# Patient Record
Sex: FEMALE | Race: ASIAN | Employment: UNEMPLOYED | URBAN - METROPOLITAN AREA
[De-identification: names, ages, dates, MRNs, and addresses within clinical notes are randomized per-mention and may not be internally consistent; named-entity substitution may affect disease eponyms.]

---

## 2017-08-17 ENCOUNTER — HOSPITAL ENCOUNTER (INPATIENT)
Age: 42
LOS: 5 days | Discharge: HOME HEALTH | DRG: 809 | End: 2017-08-22
Attending: EMERGENCY MEDICINE | Admitting: FAMILY MEDICINE
Payer: MEDICARE

## 2017-08-17 ENCOUNTER — APPOINTMENT (OUTPATIENT)
Dept: GENERAL RADIOLOGY | Age: 42
DRG: 809 | End: 2017-08-17
Attending: EMERGENCY MEDICINE
Payer: MEDICARE

## 2017-08-17 ENCOUNTER — APPOINTMENT (OUTPATIENT)
Dept: CT IMAGING | Age: 42
DRG: 809 | End: 2017-08-17
Attending: EMERGENCY MEDICINE
Payer: MEDICARE

## 2017-08-17 DIAGNOSIS — F32.A DEPRESSED: ICD-10-CM

## 2017-08-17 DIAGNOSIS — R13.10 DYSPHAGIA, UNSPECIFIED TYPE: ICD-10-CM

## 2017-08-17 DIAGNOSIS — D72.819 LEUKOPENIA, UNSPECIFIED TYPE: ICD-10-CM

## 2017-08-17 DIAGNOSIS — I95.9 HYPOTENSION, UNSPECIFIED HYPOTENSION TYPE: ICD-10-CM

## 2017-08-17 DIAGNOSIS — R73.9 HYPERGLYCEMIA: ICD-10-CM

## 2017-08-17 DIAGNOSIS — M79.10 MYALGIA: ICD-10-CM

## 2017-08-17 DIAGNOSIS — Z00.00 ROUTINE GENERAL MEDICAL EXAMINATION AT A HEALTH CARE FACILITY: ICD-10-CM

## 2017-08-17 DIAGNOSIS — R10.30 GROIN PAIN, UNSPECIFIED LATERALITY: ICD-10-CM

## 2017-08-17 DIAGNOSIS — N18.9 CHRONIC RENAL INSUFFICIENCY, UNSPECIFIED STAGE: ICD-10-CM

## 2017-08-17 DIAGNOSIS — B20 HIV (HUMAN IMMUNODEFICIENCY VIRUS INFECTION) (HCC): ICD-10-CM

## 2017-08-17 DIAGNOSIS — B95.62 MRSA BACTEREMIA: Primary | ICD-10-CM

## 2017-08-17 DIAGNOSIS — Z30.42 ENCOUNTER FOR DEPO-PROVERA CONTRACEPTION: ICD-10-CM

## 2017-08-17 DIAGNOSIS — I10 ESSENTIAL HYPERTENSION: ICD-10-CM

## 2017-08-17 DIAGNOSIS — R78.81 MRSA BACTEREMIA: Primary | ICD-10-CM

## 2017-08-17 DIAGNOSIS — E78.00 PURE HYPERCHOLESTEROLEMIA: ICD-10-CM

## 2017-08-17 PROBLEM — Z22.322 MRSA (METHICILLIN RESISTANT STAPH AUREUS) CULTURE POSITIVE: Status: ACTIVE | Noted: 2017-08-17

## 2017-08-17 PROBLEM — D72.10 EOSINOPHILIA: Status: ACTIVE | Noted: 2017-08-17

## 2017-08-17 PROBLEM — R77.8 ELEVATED TROPONIN: Status: ACTIVE | Noted: 2017-08-17

## 2017-08-17 PROBLEM — E87.1 HYPONATREMIA: Status: ACTIVE | Noted: 2017-08-17

## 2017-08-17 PROBLEM — E86.0 DEHYDRATION: Status: ACTIVE | Noted: 2017-08-17

## 2017-08-17 PROBLEM — D64.9 ANEMIA: Status: ACTIVE | Noted: 2017-08-17

## 2017-08-17 PROBLEM — N17.9 AKI (ACUTE KIDNEY INJURY) (HCC): Status: ACTIVE | Noted: 2017-08-17

## 2017-08-17 PROBLEM — M54.2 NECK PAIN ON RIGHT SIDE: Status: ACTIVE | Noted: 2017-08-17

## 2017-08-17 LAB
ADMINISTERED INITIALS, ADMINIT: NORMAL
ALBUMIN SERPL-MCNC: 2.3 G/DL (ref 3.4–5)
ALBUMIN/GLOB SERPL: 0.5 {RATIO} (ref 0.8–1.7)
ALP SERPL-CCNC: 103 U/L (ref 45–117)
ALT SERPL-CCNC: 19 U/L (ref 13–56)
AMORPH CRY URNS QL MICRO: ABNORMAL
ANION GAP BLD CALC-SCNC: 18 MMOL/L (ref 10–20)
ANION GAP SERPL CALC-SCNC: 10 MMOL/L (ref 3–18)
ANION GAP SERPL CALC-SCNC: 11 MMOL/L (ref 3–18)
ANION GAP SERPL CALC-SCNC: 9 MMOL/L (ref 3–18)
ANION GAP SERPL CALC-SCNC: 9 MMOL/L (ref 3–18)
APPEARANCE UR: CLEAR
ARTERIAL PATENCY WRIST A: ABNORMAL
AST SERPL-CCNC: 5 U/L (ref 15–37)
BACTERIA URNS QL MICRO: ABNORMAL /HPF
BASE EXCESS BLDV CALC-SCNC: 1 MMOL/L
BASOPHILS # BLD: 0.1 K/UL (ref 0–0.06)
BASOPHILS # BLD: 0.1 K/UL (ref 0–0.1)
BASOPHILS NFR BLD: 5 % (ref 0–2)
BASOPHILS NFR BLD: 6 % (ref 0–3)
BDY SITE: ABNORMAL
BILIRUB SERPL-MCNC: 1 MG/DL (ref 0.2–1)
BILIRUB UR QL: NEGATIVE
BODY TEMPERATURE: 97.6
BUN BLD-MCNC: 55 MG/DL (ref 7–18)
BUN SERPL-MCNC: 33 MG/DL (ref 7–18)
BUN SERPL-MCNC: 36 MG/DL (ref 7–18)
BUN SERPL-MCNC: 51 MG/DL (ref 7–18)
BUN SERPL-MCNC: 55 MG/DL (ref 7–18)
BUN/CREAT SERPL: 14 (ref 12–20)
BUN/CREAT SERPL: 15 (ref 12–20)
BUN/CREAT SERPL: 15 (ref 12–20)
BUN/CREAT SERPL: 16 (ref 12–20)
CA-I BLD-MCNC: 0.97 MMOL/L (ref 1.12–1.32)
CALCIUM SERPL-MCNC: 6.3 MG/DL (ref 8.5–10.1)
CALCIUM SERPL-MCNC: 6.9 MG/DL (ref 8.5–10.1)
CALCIUM SERPL-MCNC: 7.3 MG/DL (ref 8.5–10.1)
CALCIUM SERPL-MCNC: 7.4 MG/DL (ref 8.5–10.1)
CHLORIDE BLD-SCNC: 92 MMOL/L (ref 100–108)
CHLORIDE SERPL-SCNC: 103 MMOL/L (ref 100–108)
CHLORIDE SERPL-SCNC: 104 MMOL/L (ref 100–108)
CHLORIDE SERPL-SCNC: 110 MMOL/L (ref 100–108)
CHLORIDE SERPL-SCNC: 92 MMOL/L (ref 100–108)
CK MB CFR SERPL CALC: ABNORMAL % (ref 0–4)
CK MB SERPL-MCNC: <1 NG/ML (ref 5–25)
CK SERPL-CCNC: 32 U/L (ref 26–192)
CO2 BLD-SCNC: 23 MMOL/L (ref 19–24)
CO2 SERPL-SCNC: 20 MMOL/L (ref 21–32)
CO2 SERPL-SCNC: 21 MMOL/L (ref 21–32)
CO2 SERPL-SCNC: 21 MMOL/L (ref 21–32)
CO2 SERPL-SCNC: 25 MMOL/L (ref 21–32)
COLOR UR: YELLOW
CREAT SERPL-MCNC: 2.38 MG/DL (ref 0.6–1.3)
CREAT SERPL-MCNC: 2.43 MG/DL (ref 0.6–1.3)
CREAT SERPL-MCNC: 3.21 MG/DL (ref 0.6–1.3)
CREAT SERPL-MCNC: 3.79 MG/DL (ref 0.6–1.3)
CREAT UR-MCNC: 3.6 MG/DL (ref 0.6–1.3)
D50 ADMINISTERED, D50ADM: 0 ML
D50 ADMINISTERED, D50ADM: 12 ML
D50 ORDER, D50ORD: 0 ML
D50 ORDER, D50ORD: 12 ML
DIFFERENTIAL METHOD BLD: ABNORMAL
DIFFERENTIAL METHOD BLD: ABNORMAL
EOSINOPHIL # BLD: 0.1 K/UL (ref 0–0.4)
EOSINOPHIL # BLD: 0.5 K/UL (ref 0–0.4)
EOSINOPHIL NFR BLD: 30 % (ref 0–5)
EOSINOPHIL NFR BLD: 8 % (ref 0–5)
EPITH CASTS URNS QL MICRO: ABNORMAL /LPF (ref 0–5)
ERYTHROCYTE [DISTWIDTH] IN BLOOD BY AUTOMATED COUNT: 16.5 % (ref 11.6–14.5)
ERYTHROCYTE [DISTWIDTH] IN BLOOD BY AUTOMATED COUNT: 16.6 % (ref 11.6–14.5)
EST. AVERAGE GLUCOSE BLD GHB EST-MCNC: 235 MG/DL
GAS FLOW.O2 O2 DELIVERY SYS: ABNORMAL L/MIN
GLOBULIN SER CALC-MCNC: 5 G/DL (ref 2–4)
GLUCOSE BLD STRIP.AUTO-MCNC: 121 MG/DL (ref 70–110)
GLUCOSE BLD STRIP.AUTO-MCNC: 151 MG/DL (ref 70–110)
GLUCOSE BLD STRIP.AUTO-MCNC: 206 MG/DL (ref 70–110)
GLUCOSE BLD STRIP.AUTO-MCNC: 244 MG/DL (ref 70–110)
GLUCOSE BLD STRIP.AUTO-MCNC: 266 MG/DL (ref 70–110)
GLUCOSE BLD STRIP.AUTO-MCNC: 356 MG/DL (ref 70–110)
GLUCOSE BLD STRIP.AUTO-MCNC: 437 MG/DL (ref 70–110)
GLUCOSE BLD STRIP.AUTO-MCNC: 474 MG/DL (ref 70–110)
GLUCOSE BLD STRIP.AUTO-MCNC: 565 MG/DL (ref 70–110)
GLUCOSE BLD STRIP.AUTO-MCNC: 591 MG/DL (ref 74–106)
GLUCOSE BLD STRIP.AUTO-MCNC: 598 MG/DL (ref 70–110)
GLUCOSE BLD STRIP.AUTO-MCNC: 70 MG/DL (ref 70–110)
GLUCOSE BLD STRIP.AUTO-MCNC: 80 MG/DL (ref 70–110)
GLUCOSE BLD STRIP.AUTO-MCNC: 81 MG/DL (ref 70–110)
GLUCOSE BLD STRIP.AUTO-MCNC: 87 MG/DL (ref 70–110)
GLUCOSE BLD STRIP.AUTO-MCNC: 90 MG/DL (ref 70–110)
GLUCOSE BLD STRIP.AUTO-MCNC: 94 MG/DL (ref 70–110)
GLUCOSE BLD STRIP.AUTO-MCNC: 95 MG/DL (ref 70–110)
GLUCOSE SERPL-MCNC: 283 MG/DL (ref 74–99)
GLUCOSE SERPL-MCNC: 385 MG/DL (ref 74–99)
GLUCOSE SERPL-MCNC: 591 MG/DL (ref 74–99)
GLUCOSE SERPL-MCNC: 71 MG/DL (ref 74–99)
GLUCOSE UR STRIP.AUTO-MCNC: >1000 MG/DL
GLUCOSE, GLC: 121 MG/DL
GLUCOSE, GLC: 151 MG/DL
GLUCOSE, GLC: 206 MG/DL
GLUCOSE, GLC: 244 MG/DL
GLUCOSE, GLC: 266 MG/DL
GLUCOSE, GLC: 356 MG/DL
GLUCOSE, GLC: 437 MG/DL
GLUCOSE, GLC: 474 MG/DL
GLUCOSE, GLC: 563 MG/DL
GLUCOSE, GLC: 591 MG/DL
GLUCOSE, GLC: 70 MG/DL
GLUCOSE, GLC: 80 MG/DL
GLUCOSE, GLC: 81 MG/DL
GLUCOSE, GLC: 90 MG/DL
GLUCOSE, GLC: 92 MG/DL
GLUCOSE, GLC: 95 MG/DL
HBA1C MFR BLD: 9.8 % (ref 4.2–5.6)
HCG SERPL QL: NEGATIVE
HCO3 BLDV-SCNC: 25.4 MMOL/L (ref 23–28)
HCT VFR BLD AUTO: 27.5 % (ref 35–45)
HCT VFR BLD AUTO: 30.1 % (ref 35–45)
HCT VFR BLD CALC: 30 % (ref 36–49)
HETEROPH AB SER QL: NEGATIVE
HGB BLD-MCNC: 10.2 G/DL (ref 12–16)
HGB BLD-MCNC: 10.6 G/DL (ref 12–16)
HGB BLD-MCNC: 9.4 G/DL (ref 12–16)
HGB UR QL STRIP: NEGATIVE
HIGH TARGET, HITG: 180 MG/DL
INR PPP: 1 (ref 0.8–1.2)
INSULIN ADMINSTERED, INSADM: 0 UNITS/HOUR
INSULIN ADMINSTERED, INSADM: 0.2 UNITS/HOUR
INSULIN ADMINSTERED, INSADM: 1.2 UNITS/HOUR
INSULIN ADMINSTERED, INSADM: 10.6 UNITS/HOUR
INSULIN ADMINSTERED, INSADM: 11.3 UNITS/HOUR
INSULIN ADMINSTERED, INSADM: 15.1 UNITS/HOUR
INSULIN ADMINSTERED, INSADM: 2.1 UNITS/HOUR
INSULIN ADMINSTERED, INSADM: 2.7 UNITS/HOUR
INSULIN ADMINSTERED, INSADM: 3.7 UNITS/HOUR
INSULIN ADMINSTERED, INSADM: 4.4 UNITS/HOUR
INSULIN ADMINSTERED, INSADM: 5.9 UNITS/HOUR
INSULIN ADMINSTERED, INSADM: 8.3 UNITS/HOUR
INSULIN ORDER, INSORD: 0 UNITS/HOUR
INSULIN ORDER, INSORD: 0.2 UNITS/HOUR
INSULIN ORDER, INSORD: 1.2 UNITS/HOUR
INSULIN ORDER, INSORD: 10.6 UNITS/HOUR
INSULIN ORDER, INSORD: 11.3 UNITS/HOUR
INSULIN ORDER, INSORD: 15.1 UNITS/HOUR
INSULIN ORDER, INSORD: 2.1 UNITS/HOUR
INSULIN ORDER, INSORD: 2.7 UNITS/HOUR
INSULIN ORDER, INSORD: 3.7 UNITS/HOUR
INSULIN ORDER, INSORD: 4.4 UNITS/HOUR
INSULIN ORDER, INSORD: 5.9 UNITS/HOUR
INSULIN ORDER, INSORD: 8.3 UNITS/HOUR
KETONES UR QL STRIP.AUTO: NEGATIVE MG/DL
LACTATE BLD-SCNC: 1.2 MMOL/L (ref 0.4–2)
LEUKOCYTE ESTERASE UR QL STRIP.AUTO: NEGATIVE
LOW TARGET, LOT: 140 MG/DL
LYMPHOCYTES # BLD: 0.9 K/UL (ref 0.9–3.6)
LYMPHOCYTES # BLD: 1 K/UL (ref 0.8–3.5)
LYMPHOCYTES NFR BLD: 57 % (ref 21–52)
LYMPHOCYTES NFR BLD: 80 % (ref 20–51)
MAGNESIUM SERPL-MCNC: 2.2 MG/DL (ref 1.6–2.6)
MCH RBC QN AUTO: 27.8 PG (ref 24–34)
MCH RBC QN AUTO: 28.4 PG (ref 24–34)
MCHC RBC AUTO-ENTMCNC: 34.2 G/DL (ref 31–37)
MCHC RBC AUTO-ENTMCNC: 35.2 G/DL (ref 31–37)
MCV RBC AUTO: 80.7 FL (ref 74–97)
MCV RBC AUTO: 81.4 FL (ref 74–97)
MINUTES UNTIL NEXT BG, NBG: 15 MIN
MINUTES UNTIL NEXT BG, NBG: 60 MIN
MONOCYTES # BLD: 0.1 K/UL (ref 0.05–1.2)
MONOCYTES # BLD: 0.1 K/UL (ref 0–1)
MONOCYTES NFR BLD: 4 % (ref 2–9)
MONOCYTES NFR BLD: 5 % (ref 3–10)
MULTIPLIER, MUL: 0
MULTIPLIER, MUL: 0.01
MULTIPLIER, MUL: 0.01
MULTIPLIER, MUL: 0.02
MULTIPLIER, MUL: 0.03
NEUTS SEG # BLD: 0 K/UL (ref 1.8–8)
NEUTS SEG # BLD: 0.1 K/UL (ref 1.8–8)
NEUTS SEG NFR BLD: 2 % (ref 42–75)
NEUTS SEG NFR BLD: 3 % (ref 40–73)
NITRITE UR QL STRIP.AUTO: NEGATIVE
O2/TOTAL GAS SETTING VFR VENT: 21 %
ORDER INITIALS, ORDINIT: NORMAL
PCO2 BLDV: 40.4 MMHG (ref 41–51)
PH BLDV: 7.4 [PH] (ref 7.32–7.42)
PH UR STRIP: 6 [PH] (ref 5–8)
PLATELET # BLD AUTO: 245 K/UL (ref 135–420)
PLATELET # BLD AUTO: 291 K/UL (ref 135–420)
PMV BLD AUTO: 10.9 FL (ref 9.2–11.8)
PMV BLD AUTO: 11.1 FL (ref 9.2–11.8)
PO2 BLDV: 32 MMHG (ref 25–40)
POTASSIUM BLD-SCNC: 4.7 MMOL/L (ref 3.5–5.5)
POTASSIUM SERPL-SCNC: 3.4 MMOL/L (ref 3.5–5.5)
POTASSIUM SERPL-SCNC: 4.2 MMOL/L (ref 3.5–5.5)
POTASSIUM SERPL-SCNC: 4.2 MMOL/L (ref 3.5–5.5)
POTASSIUM SERPL-SCNC: 4.4 MMOL/L (ref 3.5–5.5)
PROT SERPL-MCNC: 7.3 G/DL (ref 6.4–8.2)
PROT UR STRIP-MCNC: 300 MG/DL
PROTHROMBIN TIME: 13.2 SEC (ref 11.5–15.2)
RBC # BLD AUTO: 3.38 M/UL (ref 4.2–5.3)
RBC # BLD AUTO: 3.73 M/UL (ref 4.2–5.3)
RBC #/AREA URNS HPF: ABNORMAL /HPF (ref 0–5)
RBC MORPH BLD: ABNORMAL
SAO2 % BLDV: 63 % (ref 65–88)
SERVICE CMNT-IMP: ABNORMAL
SODIUM BLD-SCNC: 127 MMOL/L (ref 136–145)
SODIUM SERPL-SCNC: 126 MMOL/L (ref 136–145)
SODIUM SERPL-SCNC: 134 MMOL/L (ref 136–145)
SODIUM SERPL-SCNC: 135 MMOL/L (ref 136–145)
SODIUM SERPL-SCNC: 140 MMOL/L (ref 136–145)
SP GR UR REFRACTOMETRY: 1.02 (ref 1–1.03)
SPECIMEN TYPE: ABNORMAL
TOTAL RESP. RATE, ITRR: 27
TROPONIN I SERPL-MCNC: 0.03 NG/ML (ref 0–0.04)
TROPONIN I SERPL-MCNC: 0.04 NG/ML (ref 0–0.04)
TROPONIN I SERPL-MCNC: 0.04 NG/ML (ref 0–0.04)
TROPONIN I SERPL-MCNC: 0.05 NG/ML (ref 0–0.04)
TSH SERPL DL<=0.05 MIU/L-ACNC: 1.32 UIU/ML (ref 0.36–3.74)
UROBILINOGEN UR QL STRIP.AUTO: 0.2 EU/DL (ref 0.2–1)
WBC # BLD AUTO: 1.3 K/UL (ref 4.6–13.2)
WBC # BLD AUTO: 1.6 K/UL (ref 4.6–13.2)
WBC URNS QL MICRO: ABNORMAL /HPF (ref 0–4)

## 2017-08-17 PROCEDURE — 96367 TX/PROPH/DG ADDL SEQ IV INF: CPT

## 2017-08-17 PROCEDURE — 77030020263 HC SOL INJ SOD CL0.9% LFCR 1000ML

## 2017-08-17 PROCEDURE — 65610000006 HC RM INTENSIVE CARE

## 2017-08-17 PROCEDURE — 74011636637 HC RX REV CODE- 636/637: Performed by: EMERGENCY MEDICINE

## 2017-08-17 PROCEDURE — 87077 CULTURE AEROBIC IDENTIFY: CPT | Performed by: INTERNAL MEDICINE

## 2017-08-17 PROCEDURE — 74011250637 HC RX REV CODE- 250/637: Performed by: FAMILY MEDICINE

## 2017-08-17 PROCEDURE — 83735 ASSAY OF MAGNESIUM: CPT | Performed by: EMERGENCY MEDICINE

## 2017-08-17 PROCEDURE — 81001 URINALYSIS AUTO W/SCOPE: CPT | Performed by: EMERGENCY MEDICINE

## 2017-08-17 PROCEDURE — 80047 BASIC METABLC PNL IONIZED CA: CPT

## 2017-08-17 PROCEDURE — 70490 CT SOFT TISSUE NECK W/O DYE: CPT

## 2017-08-17 PROCEDURE — 96361 HYDRATE IV INFUSION ADD-ON: CPT

## 2017-08-17 PROCEDURE — 85025 COMPLETE CBC W/AUTO DIFF WBC: CPT | Performed by: EMERGENCY MEDICINE

## 2017-08-17 PROCEDURE — 82550 ASSAY OF CK (CPK): CPT | Performed by: EMERGENCY MEDICINE

## 2017-08-17 PROCEDURE — 36415 COLL VENOUS BLD VENIPUNCTURE: CPT | Performed by: FAMILY MEDICINE

## 2017-08-17 PROCEDURE — 82962 GLUCOSE BLOOD TEST: CPT

## 2017-08-17 PROCEDURE — 74011636637 HC RX REV CODE- 636/637

## 2017-08-17 PROCEDURE — 86308 HETEROPHILE ANTIBODY SCREEN: CPT | Performed by: INTERNAL MEDICINE

## 2017-08-17 PROCEDURE — 86664 EPSTEIN-BARR NUCLEAR ANTIGEN: CPT | Performed by: INTERNAL MEDICINE

## 2017-08-17 PROCEDURE — 84443 ASSAY THYROID STIM HORMONE: CPT | Performed by: EMERGENCY MEDICINE

## 2017-08-17 PROCEDURE — 87086 URINE CULTURE/COLONY COUNT: CPT | Performed by: EMERGENCY MEDICINE

## 2017-08-17 PROCEDURE — 74011250636 HC RX REV CODE- 250/636: Performed by: FAMILY MEDICINE

## 2017-08-17 PROCEDURE — 71010 XR CHEST PORT: CPT

## 2017-08-17 PROCEDURE — 86644 CMV ANTIBODY: CPT | Performed by: INTERNAL MEDICINE

## 2017-08-17 PROCEDURE — 51702 INSERT TEMP BLADDER CATH: CPT

## 2017-08-17 PROCEDURE — 80053 COMPREHEN METABOLIC PANEL: CPT | Performed by: EMERGENCY MEDICINE

## 2017-08-17 PROCEDURE — 84703 CHORIONIC GONADOTROPIN ASSAY: CPT | Performed by: EMERGENCY MEDICINE

## 2017-08-17 PROCEDURE — 77030034848

## 2017-08-17 PROCEDURE — 87040 BLOOD CULTURE FOR BACTERIA: CPT | Performed by: EMERGENCY MEDICINE

## 2017-08-17 PROCEDURE — 87081 CULTURE SCREEN ONLY: CPT | Performed by: INTERNAL MEDICINE

## 2017-08-17 PROCEDURE — 96365 THER/PROPH/DIAG IV INF INIT: CPT

## 2017-08-17 PROCEDURE — 85610 PROTHROMBIN TIME: CPT | Performed by: EMERGENCY MEDICINE

## 2017-08-17 PROCEDURE — 83605 ASSAY OF LACTIC ACID: CPT

## 2017-08-17 PROCEDURE — 74011000250 HC RX REV CODE- 250: Performed by: HOSPITALIST

## 2017-08-17 PROCEDURE — 82803 BLOOD GASES ANY COMBINATION: CPT

## 2017-08-17 PROCEDURE — 74011000258 HC RX REV CODE- 258: Performed by: EMERGENCY MEDICINE

## 2017-08-17 PROCEDURE — 83036 HEMOGLOBIN GLYCOSYLATED A1C: CPT | Performed by: EMERGENCY MEDICINE

## 2017-08-17 PROCEDURE — 74011250636 HC RX REV CODE- 250/636: Performed by: EMERGENCY MEDICINE

## 2017-08-17 PROCEDURE — 77030011256 HC DRSG MEPILEX <16IN NO BORD MOLN -A

## 2017-08-17 PROCEDURE — 93005 ELECTROCARDIOGRAM TRACING: CPT

## 2017-08-17 PROCEDURE — 99285 EMERGENCY DEPT VISIT HI MDM: CPT

## 2017-08-17 PROCEDURE — 80048 BASIC METABOLIC PNL TOTAL CA: CPT | Performed by: FAMILY MEDICINE

## 2017-08-17 RX ORDER — MAGNESIUM SULFATE 100 %
4 CRYSTALS MISCELLANEOUS AS NEEDED
Status: DISCONTINUED | OUTPATIENT
Start: 2017-08-17 | End: 2017-08-22 | Stop reason: HOSPADM

## 2017-08-17 RX ORDER — DEXTROSE 50 % IN WATER (D50W) INTRAVENOUS SYRINGE
25-50 AS NEEDED
Status: DISCONTINUED | OUTPATIENT
Start: 2017-08-17 | End: 2017-08-22 | Stop reason: HOSPADM

## 2017-08-17 RX ORDER — NOREPINEPHRINE BITARTRATE/D5W 8 MG/250ML
2-16 PLASTIC BAG, INJECTION (ML) INTRAVENOUS
Status: DISCONTINUED | OUTPATIENT
Start: 2017-08-17 | End: 2017-08-18

## 2017-08-17 RX ORDER — LEVOFLOXACIN 5 MG/ML
750 INJECTION, SOLUTION INTRAVENOUS
Status: COMPLETED | OUTPATIENT
Start: 2017-08-17 | End: 2017-08-17

## 2017-08-17 RX ORDER — LINEZOLID 2 MG/ML
600 INJECTION, SOLUTION INTRAVENOUS EVERY 12 HOURS
Status: DISCONTINUED | OUTPATIENT
Start: 2017-08-17 | End: 2017-08-18

## 2017-08-17 RX ORDER — MORPHINE SULFATE 2 MG/ML
2 INJECTION, SOLUTION INTRAMUSCULAR; INTRAVENOUS
Status: DISCONTINUED | OUTPATIENT
Start: 2017-08-17 | End: 2017-08-18

## 2017-08-17 RX ORDER — HEPARIN SODIUM 5000 [USP'U]/ML
5000 INJECTION, SOLUTION INTRAVENOUS; SUBCUTANEOUS EVERY 8 HOURS
Status: DISCONTINUED | OUTPATIENT
Start: 2017-08-17 | End: 2017-08-22 | Stop reason: HOSPADM

## 2017-08-17 RX ORDER — DEXTROSE 50 % IN WATER (D50W) INTRAVENOUS SYRINGE
12 ONCE
Status: COMPLETED | OUTPATIENT
Start: 2017-08-17 | End: 2017-08-17

## 2017-08-17 RX ORDER — SODIUM CHLORIDE 9 MG/ML
150 INJECTION, SOLUTION INTRAVENOUS CONTINUOUS
Status: DISCONTINUED | OUTPATIENT
Start: 2017-08-17 | End: 2017-08-18

## 2017-08-17 RX ORDER — ACETAMINOPHEN 650 MG/1
650 SUPPOSITORY RECTAL
Status: DISCONTINUED | OUTPATIENT
Start: 2017-08-17 | End: 2017-08-22 | Stop reason: HOSPADM

## 2017-08-17 RX ORDER — INSULIN LISPRO 100 [IU]/ML
INJECTION, SOLUTION INTRAVENOUS; SUBCUTANEOUS EVERY 6 HOURS
Status: DISCONTINUED | OUTPATIENT
Start: 2017-08-18 | End: 2017-08-18

## 2017-08-17 RX ADMIN — HEPARIN SODIUM 5000 UNITS: 5000 INJECTION, SOLUTION INTRAVENOUS; SUBCUTANEOUS at 23:48

## 2017-08-17 RX ADMIN — DEXTROSE MONOHYDRATE 6 G: 25 INJECTION, SOLUTION INTRAVENOUS at 19:05

## 2017-08-17 RX ADMIN — MORPHINE SULFATE 2 MG: 2 INJECTION, SOLUTION INTRAMUSCULAR; INTRAVENOUS at 15:07

## 2017-08-17 RX ADMIN — PIPERACILLIN SODIUM,TAZOBACTAM SODIUM 4.5 G: 4; .5 INJECTION, POWDER, FOR SOLUTION INTRAVENOUS at 03:14

## 2017-08-17 RX ADMIN — SODIUM CHLORIDE 150 ML/HR: 900 INJECTION, SOLUTION INTRAVENOUS at 15:33

## 2017-08-17 RX ADMIN — LINEZOLID 600 MG: 600 INJECTION, SOLUTION INTRAVENOUS at 14:26

## 2017-08-17 RX ADMIN — SODIUM CHLORIDE 1000 ML: 900 INJECTION, SOLUTION INTRAVENOUS at 04:56

## 2017-08-17 RX ADMIN — SODIUM CHLORIDE 10.6 UNITS/HR: 900 INJECTION, SOLUTION INTRAVENOUS at 03:51

## 2017-08-17 RX ADMIN — HEPARIN SODIUM 5000 UNITS: 5000 INJECTION, SOLUTION INTRAVENOUS; SUBCUTANEOUS at 08:07

## 2017-08-17 RX ADMIN — SODIUM CHLORIDE 150 ML/HR: 900 INJECTION, SOLUTION INTRAVENOUS at 19:32

## 2017-08-17 RX ADMIN — MORPHINE SULFATE 2 MG: 2 INJECTION, SOLUTION INTRAMUSCULAR; INTRAVENOUS at 08:08

## 2017-08-17 RX ADMIN — SODIUM CHLORIDE 150 ML/HR: 900 INJECTION, SOLUTION INTRAVENOUS at 06:50

## 2017-08-17 RX ADMIN — LINEZOLID 600 MG: 600 INJECTION, SOLUTION INTRAVENOUS at 05:30

## 2017-08-17 RX ADMIN — HEPARIN SODIUM 5000 UNITS: 5000 INJECTION, SOLUTION INTRAVENOUS; SUBCUTANEOUS at 14:23

## 2017-08-17 RX ADMIN — ACETAMINOPHEN 650 MG: 650 SOLUTION ORAL at 20:46

## 2017-08-17 RX ADMIN — SODIUM CHLORIDE 15.1 UNITS/HR: 900 INJECTION, SOLUTION INTRAVENOUS at 04:51

## 2017-08-17 RX ADMIN — SODIUM CHLORIDE 8.3 UNITS/HR: 900 INJECTION, SOLUTION INTRAVENOUS at 05:57

## 2017-08-17 RX ADMIN — LEVOFLOXACIN 750 MG: 5 INJECTION, SOLUTION INTRAVENOUS at 03:58

## 2017-08-17 RX ADMIN — SODIUM CHLORIDE 2000 ML: 900 INJECTION, SOLUTION INTRAVENOUS at 02:03

## 2017-08-17 RX ADMIN — Medication 8.3 UNITS: at 06:34

## 2017-08-17 RX ADMIN — MORPHINE SULFATE 2 MG: 2 INJECTION, SOLUTION INTRAMUSCULAR; INTRAVENOUS at 23:47

## 2017-08-17 NOTE — IP AVS SNAPSHOT
[address] [place] [address] 
[phone] Patient: Mark MRN: [id number] QWV:7/99/1972 You are allergic to the following Allergen Reactions Metformin Other (comments) Stopped because of heart disease by cardiologist 
  
    
 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 CONFIRM ONLY, BY MULTISPOT In process CULTURE, BLOOD Preliminary result CULTURE, BLOOD Preliminary result Emergency Contacts Name Discharge Info Relation Home Work Mobile Fely Montague  Other Relative [6] [phone] Tania Guillen  Child [2]   [phone] About your hospitalization You were admitted on:  August 17, 2017 You last received care in the:  [address] [place], [place] [address] You were discharged on:  August 22, 2017 Unit phone number:  [phone] Why you were hospitalized Your primary diagnosis was: Tonsillitis Your diagnoses also included:  Hiv (Human Immunodeficiency Virus Infection) ([facility]), Dehydration, Hyponatremia, Leukopenia, Anemia, Hyperglycemia, Dysphagia, Elevated Troponin, Nirav (Acute Kidney Injury) ([facility]), 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, Neck Pain On Right Side, Eosinophilia, Diabetes Mellitus Type 2, Controlled ([facility]) Providers Seen During Your Hospitalizations Provider Role Specialty Primary office phone Manuel Watkins MD Attending Provider Emergency Medicine [phone] Chris Talley MD Attending Provider [facility] [phone] Elder Mendez DO Attending Provider Internal Medicine [phone] Arun Rondon MD Attending Provider [facility] [phone] Rita Sawant,  Attending Provider Internal Medicine [phone] Your Primary Care Physician (PCP) Primary Care Physician Office Phone Office Fax Salas Lozano [phone] Follow-up Information Follow up With Details Comments Contact Info Naresh Chairez.DO On 8/28/2017 8am [address] [facility] [address] [facility] [place] [address] [phone] Your Appointments Monday August 28, 2017  8:00 AM EDT HOSPITAL FOLLOW-UP with Naresh Chairez.,  [address] [address] [address] [address]  [facility] [address] [phone] Current Discharge Medication List  
  
START taking these medications Dose & Instructions Dispensing Information Comments Morning Noon Evening Bedtime  
 clindamycin 300 mg capsule Commonly known as:  CLEOCIN Your last dose was: Your next dose is:    
   
   
 Dose:  300 mg Take 1 Cap by mouth three (3) times daily. Quantity:  24 Cap Refills:  0  
     
   
   
   
  
 lidocaine 2 % solution Commonly known as:  XYLOCAINE Your last dose was: Your next dose is:    
   
   
 Dose:  5 mL Take 5 mL by mouth three (3) times daily as needed for Pain (gargle and then spit out). Quantity:  1 Bottle Refills:  0  
     
   
   
   
  
 methylPREDNISolone 4 mg tablet Commonly known as:  Richard Sales Your last dose was: Your next dose is: Take as instructed Quantity:  1 Dose Pack Refills:  0  
     
   
   
   
  
 oxyCODONE-acetaminophen 5-325 mg per tablet Commonly known as:  PERCOCET Your last dose was: Your next dose is:    
   
   
 Dose:  1-2 Tab Take 1-2 Tabs by mouth every four (4) hours as needed. Max Daily Amount: 12 Tabs. Quantity:  12 Tab Refills:  0 CONTINUE these medications which have CHANGED Dose & Instructions Dispensing Information Comments Morning Noon Evening Bedtime  
 emtricitabine-tenofovir (TDF) 200-300 mg per tablet Commonly known as:  Moiz Ghotra What changed:  how much to take Your last dose was: Your next dose is:    
   
   
 Dose:  1 Tab Take 1 Tab by mouth daily. Quantity:  30 Tab Refills:  0  
     
   
   
   
  
 metoprolol tartrate 100 mg IR tablet Commonly known as:  LOPRESSOR What changed:  medication strength Your last dose was: Your next dose is:    
   
   
 Dose:  100 mg Take 1 Tab by mouth two (2) times a day. Quantity:  60 Tab Refills:  0 CONTINUE these medications which have NOT CHANGED Dose & Instructions Dispensing Information Comments Morning Noon Evening Bedtime  
 amitriptyline 100 mg tablet Commonly known as:  ELAVIL Your last dose was: Your next dose is:    
   
   
 Dose:  100 mg Take 100 mg by mouth nightly. Refills:  0  
     
   
   
   
  
 aspirin 81 mg chewable tablet Your last dose was: Your next dose is:    
   
   
 Dose:  81 mg Take 81 mg by mouth daily. Refills:  0  
     
   
   
   
  
 atorvastatin 80 mg tablet Commonly known as:  LIPITOR Your last dose was: Your next dose is:    
   
   
 Dose:  80 mg Take 1 Tab by mouth daily. Quantity:  30 Tab Refills:  0 Blood-Glucose Meter monitoring kit Your last dose was: Your next dose is: Any reliable brand -- test twice a day -- dx 250.00 Quantity:  1 kit Refills:  0  
     
   
   
   
  
 clopidogrel 75 mg Tab Commonly known as:  PLAVIX Your last dose was: Your next dose is:    
   
   
 Dose:  75 mg Take 1 Tab by mouth daily. Quantity:  30 Tab Refills:  0  
     
   
   
   
  
 glucose blood VI test strips strip Commonly known as:  blood glucose test  
   
Your last dose was: Your next dose is:    
   
   
 Test twice a day -- dispense any reliable brand to work with patients glucometer Quantity:  1 Package Refills:  11 Insulin Needles (Disposable) 29 gauge x 1/2\" Ndle Commonly known as:  BD INSULIN PEN NEEDLE UF ORIG Your last dose was: Your next dose is:    
   
   
 Diagnosis 250.00 use once daily Quantity:  100 Each Refills:  12 LANTUS SOLOSTAR 100 unit/mL (3 mL) Inpn Generic drug:  insulin glargine Your last dose was: Your next dose is: START 74 UNITS TWO TIMES A DAY . INCREASE BY 1 UNITS AM AND PM EVERY 3 DAYS UNTIL FBG IS CONSISTENTLY 150 Quantity:  5 Each Refills:  0  
     
   
   
   
  
 losartan 50 mg tablet Commonly known as:  COZAAR Your last dose was: Your next dose is:    
   
   
 Dose:  50 mg Take 1 Tab by mouth daily. Quantity:  30 Tab Refills:  0  
     
   
   
   
  
 NIFEdipine ER 30 mg ER tablet Commonly known as:  NIFEDICAL XL Your last dose was: Your next dose is:    
   
   
 Dose:  30 mg Take 1 Tab by mouth daily. Quantity:  30 Tab Refills:  0  
     
   
   
   
  
 raltegravir 400 mg tablet Commonly known as:  ISENTRESS Your last dose was: Your next dose is:    
   
   
 Dose:  400 mg Take 1 Tab by mouth two (2) times a day. Quantity:  60 Tab Refills:  0 SITagliptin 100 mg tablet Commonly known as:  Rosa Cantrell Your last dose was: Your next dose is:    
   
   
 Dose:  100 mg Take 1 Tab by mouth daily. Quantity:  30 Tab Refills:  0 STOP taking these medications HYDROcodone-acetaminophen 5-325 mg per tablet Commonly known as:  Aida Estrada Where to Get Your Medications These medications were sent to Meryl Izquierdo [address] [place] [address] [facility] [address]  [address] [facility],[address], [address] Phone:  [phone]  
  atorvastatin 80 mg tablet  
 clindamycin 300 mg capsule  
 clopidogrel 75 mg Tab  
 emtricitabine-tenofovir (TDF) 200-300 mg per tablet  
 lidocaine 2 % solution  
 losartan 50 mg tablet  
 methylPREDNISolone 4 mg tablet  
 metoprolol tartrate 100 mg IR tablet NIFEdipine ER 30 mg ER tablet  
 raltegravir 400 mg tablet SITagliptin 100 mg tablet Information on where to get these meds will be given to you by the nurse or doctor. ! Ask your nurse or doctor about these medications  
  oxyCODONE-acetaminophen 5-325 mg per tablet Discharge Instructions FOLLOW UP VISIT Appointment in: Other (Specify) PCP within 1 week Electrolyte Imbalance: Care Instructions Your Care Instructions Electrolytes are minerals in your blood. They include sodium, potassium, calcium, and magnesium. When they are not at the right levels, you can feel very ill. You may not know what is causing it, but you know something is wrong. You may feel weak or numb, have muscle spasms, or twitch. Your heart may beat fast. Symptoms are different with each mineral. Too much is as bad as too little. Minerals help keep your body working as it should. Vomiting, diarrhea, and fever can cause you to lose minerals. A problem with your kidneys can tip a mineral out of balance. So can taking certain medicines. Your doctor may do more tests. He or she may change your medicine and diet. If you are low in one or more minerals, they may be given through a tube into your vein (IV). Your doctor may have you take or drink special fluids or pills. If your kidneys are failing, your blood may be filtered. This is called dialysis. It can put the minerals back in balance. Follow-up care is a key part of your treatment and safety.  Be sure to make and go to all appointments, and call your doctor if you are having problems. It's also a good idea to know your test results and keep a list of the medicines you take. How can you care for yourself at home? · Take your medicines exactly as prescribed. Call your doctor if you have any problems with your medicines. You will get more details on the specific medicines your doctor prescribes. · Do not take any medicine without talking to your doctor first. This includes prescription, over-the-counter, and herbal medicines. · If you have kidney, heart, or liver disease and have to limit fluids, talk with your doctor before you increase the amount of fluids you drink. · Your doctor or dietitian may give you a diet plan to help balance your minerals. Follow the diet carefully. When should you call for help? Call 911 anytime you think you may need emergency care. For example, call if: 
· You passed out (lost consciousness). · Your heart seems to be speeding up and then slowing down or skipping beats. Call your doctor now or seek immediate medical care if: 
· You are very tired and have no energy. · You have trouble thinking or concentrating. Watch closely for changes in your health, and be sure to contact your doctor if: 
· You want advice on what to do to keep your minerals in balance. · You do not get better as expected. Where can you learn more? Go to http://alexandra-pantera.info/. Enter C626 in the search box to learn more about \"Electrolyte Imbalance: Care Instructions. \" Current as of: July 26, 2016 Content Version: 11.3 © 5068-7266 [employer]. Care instructions adapted under license by [employer] (which disclaims liability or warranty for this information). If you have questions about a medical condition or this instruction, always ask your healthcare professional. [place] [address] any warranty or liability for your use of this information. HIV: Care Instructions Your Care Instructions Human immunodeficiency virus, or HIV, is a virus that attacks your immune system. This makes it hard for your body to fight infection and disease. HIV is the virus that causes AIDS (acquired immunodeficiency syndrome). But having HIV does not mean that you have AIDS. Treatment of HIV may prevent or delay HIV from developing into AIDS. HIV often causes flu-like symptoms soon after a person gets infected. These early symptoms go away in a few weeks. After that, you may not have signs of illness for many years. But as the virus multiplies in your body, symptoms reappear and then remain. Fatigue, weight loss, fever, night sweats, diarrhea, and other symptoms are common. If HIV is not treated and progresses to AIDS, your symptoms get worse and your body is less and less able to fight infections like pneumonia and tuberculosis. Medicines are the main treatment for HIV. You will likely have to take several medicines, sometimes called an anti-HIV \"cocktail. \" By fighting the virus, these medicines can help your immune system stay healthy and delay or prevent AIDS, and may help you live longer. Medicines for HIV are called antiretrovirals. Follow-up care is a key part of your treatment and safety. Be sure to make and go to all appointments, and call your doctor if you are having problems. It's also a good idea to know your test results and keep a list of the medicines you take. How can you care for yourself at home? · If you are taking medicines for HIV, take them exactly as directed, in the right dose and at the right time. Call your doctor if you think you are having a problem with your medicine. · Learn how to shop for, prepare, and store food safely to reduce your risk of food poisoning. People with HIV are more likely to get food-borne diseases. · Stop smoking. People with HIV have an increased risk of heart attacks and lung cancer. Smoking increases these risks even more.  If you need help quitting, talk to your doctor about stop-smoking programs and medicines. These can increase your chances of quitting for good. · Do not use illegal drugs, and limit your use of alcohol. Using intravenous (IV) illegal drugs increases the risk that your HIV will get worse and makes it harder to follow your treatment plan. Abuse of alcohol, marijuana, cocaine, or other illegal drugs also makes HIV get worse faster. · Eat a healthy diet. Good nutrition can help your immune system and improve your overall health. Staying at a healthy weight can be hard, since weight loss and digestion problems are common with HIV and are side effects of some HIV medicines. Sometimes you just won't feel like eating. Talk to your doctor or see a dietitian if you need help. · Get regular exercise. It relieves stress. It also keeps your heart, lungs, and muscles strong and helps you feel less tired. It may also help your immune system work better. · Learn more about HIV. This will let you take a more active role in your care. It may help you feel more in control of your life. · Join a support group. Support groups can be a good place to share information, problem-solving tips, and emotions. To avoid spreading HIV to others · Take antiretroviral medicines. Getting treated for HIV can help prevent the spread of HIV to people who are not infected. · Tell your sex partner or partners that you have HIV. Do not have sex with anyone unless you have told him or her that you have HIV. · If you and your partner choose to have sex, always use a latex condom. · Do not share needles if you use IV drugs. · Do not share toothbrushes, razors, sex toys, or other items that may have blood, semen, or vaginal fluids on them. · Do not donate blood, plasma, semen, body organs, or body tissues. When should you call for help? Call 911 anytime you think you may need emergency care. For example, call if: 
· You have seizures. · You passed out (lost consciousness). · You have new weakness in an arm, a leg, or on one side of your body. · You have new changes in balance or sensation (numbness, tingling, or pain). · You are suddenly not able to stand or walk. Call your doctor now or seek immediate medical care if: 
· You have a fever that ever reaches 103°F or higher. · You have a fever of 101°F or higher for 24 hours. · You have shortness of breath. · You have unusual bleeding, such as from your nose or gums, blood in your urine or stool, or easy bruising. · You have changes in vision. Watch closely for changes in your health, and be sure to contact your doctor if: 
· You have a cough that does not go away, especially if you also have a fever. · You have rapid, unexplained weight loss. · You have night sweats. · You have swollen lymph nodes in your neck, armpits, or groin. · You feel very tired. Where can you learn more? Go to http://alexandra-pantera.info/. Enter D783 in the search box to learn more about \"HIV: Care Instructions. \" Current as of: March 3, 2017 Content Version: 11.3 © 1648-1896 [employer]. Care instructions adapted under license by [employer] (which disclaims liability or warranty for this information). If you have questions about a medical condition or this instruction, always ask your healthcare professional. [place] [address] any warranty or liability for your use of this information. HIV Medicine Management: Care Instructions Your Care Instructions Taking antiretroviral medicines for HIV may allow you to stay healthy for a long time. Successful treatment of your HIV infection depends on following a strict schedule for taking medicine. Not taking medicine when you are supposed to can lead to problems such as drug resistance and higher viral loads, and the disease may get worse. In the past, schedules for taking medicine for HIV were very complicated. Taking many pills several times each day was difficult. But the routine has become much more simple, and you may only have to take medicine one or two times each day. Follow-up care is a key part of your treatment and safety. Be sure to make and go to all appointments, and call your doctor if you are having problems. It's also a good idea to know your test results and keep a list of the medicines you take. How can you care for yourself at home? Staying on your schedule · Know the names of all of your medicines. Ask your doctor or pharmacist to clearly explain the actions and purpose of each of your medicines. If you understand what you are taking and how it helps, it may be easier to follow your schedule. Write down the generic name (and brand name if there is one) for all of your medicines. Have your doctor check the list. 
· Know when to take your medicine. Write down a schedule, and have your doctor check it. Try to take them around the same times every day. · Use a pillbox with compartments for each time you need to take your medicines. Using a pillbox lets you know when you have taken each dose so that you do not miss a dose. And it also will help you not take too many pills. · Know how to handle missed doses. Talk with your doctor about what you should do if you miss a dose. Discuss what to do for each medicineit may be different for each one. Other tips · Always check with your doctor before taking any other medicines. This includes over-the-counter medicines and any herbal or \"natural\" supplements. · Learn about other medicines you should not take at the same time as your antiretroviral medicines. · Store medicines properly. Find out from your doctor or pharmacist how to properly store your medicines. Keeping medicines in a location that is too hot or too cold may decrease how well they work.  Always store medicines out of the reach of children. · Watch for side effects. Ask your doctor or pharmacist what to expect. Tell your doctor right away if you have any serious side effects. Do not stop taking your medicine or change the dose on your own. This could cause the medicine to stop working. Work with your doctor to find a solution. · Make a list of all the medicines you take, and bring it with you to each visit with your doctor. Have your doctor review your list at each visit. When should you call for help? Call 911 anytime you think you may need emergency care. For example, call if: 
· You are wheezing or having trouble breathing after starting a medicine. · Your lips, throat, tongue, or face are swelling quickly. Call your doctor now or seek immediate medical care if: 
· You have problems taking your medicine. · You have trouble taking your medicine when you are supposed to. · You notice side effects from your medicine. These may include: ¨ A skin rash. ¨ A fever. ¨ Nausea and vomiting. ¨ Fatigue. ¨ Trouble breathing. Watch closely for changes in your health, and be sure to contact your doctor if you have any problems. Where can you learn more? Go to http://alexandra-pantera.info/. Enter 0664 334 28 [id number] in the search box to learn more about \"HIV Medicine Management: Care Instructions. \" Current as of: March 3, 2017 Content Version: 11.3 © 3546-2881 [employer]. Care instructions adapted under license by [employer] (which disclaims liability or warranty for this information). If you have questions about a medical condition or this instruction, always ask your healthcare professional. [place] [address] any warranty or liability for your use of this information. Tonsillitis: Care Instructions Your Care Instructions Tonsillitis is an infection of the tonsils that is caused by bacteria or a virus. The tonsils are in the back of the throat and are part of the immune system. Tonsillitis typically lasts from a few days up to a couple of weeks. Tonsillitis caused by a virus goes away on its own. Tonsillitis caused by the bacteria that causes strep throat is treated with antibiotics. You and your doctor may consider surgery to remove the tonsils (tonsillectomy) if you have serious complications or repeat infections. Follow-up care is a key part of your treatment and safety. Be sure to make and go to all appointments, and call your doctor if you are having problems. It's also a good idea to know your test results and keep a list of the medicines you take. How can you care for yourself at home? · If your doctor prescribed antibiotics, take them as directed. Do not stop taking them just because you feel better. You need to take the full course of antibiotics. · Gargle with warm salt water. This helps reduce swelling and relieve discomfort. Gargle once an hour with 1 teaspoon of salt mixed in 8 fluid ounces of warm water. · Take an over-the-counter pain medicine, such as acetaminophen (Tylenol), ibuprofen (Advil, Motrin), or naproxen (Aleve). Be safe with medicines. Read and follow all instructions on the label. No one younger than 20 should take aspirin. It has been linked to Reye syndrome, a serious illness. · Be careful when taking over-the-counter cold or flu medicines and Tylenol at the same time. Many of these medicines have acetaminophen, which is Tylenol. Read the labels to make sure that you are not taking more than the recommended dose. Too much acetaminophen (Tylenol) can be harmful. · Try an over-the-counter throat spray to relieve throat pain. · Drink plenty of fluids. Fluids may help soothe an irritated throat. Drink warm or cool liquids (whichever feels better). These include tea, soup, and juice. · Do not smoke, and avoid secondhand smoke.  Smoking can make tonsillitis worse. If you need help quitting, talk to your doctor about stop-smoking programs and medicines. These can increase your chances of quitting for good. · Use a vaporizer or humidifier to add moisture to your bedroom. Follow the directions for cleaning the machine. When should you call for help? Call your doctor now or seek immediate medical care if: 
· Your pain gets worse on one side of your throat. · You have a new or higher fever. · You notice changes in your voice. · You have trouble opening your mouth. · You have any trouble breathing. · You have much more trouble swallowing. · You have a fever with a stiff neck or a severe headache. · You are sensitive to light or feel very sleepy or confused. Watch closely for changes in your health, and be sure to contact your doctor if: 
· You do not get better after 2 days. Where can you learn more? Go to http://alexandra-pantera.info/. Enter H729 in the search box to learn more about \"Tonsillitis: Care Instructions. \" Current as of: July 29, 2016 Content Version: 11.3 © 7865-0923 [employer]. Care instructions adapted under license by [employer] (which disclaims liability or warranty for this information). If you have questions about a medical condition or this instruction, always ask your healthcare professional. [address] any warranty or liability for your use of this information. Discharge Orders None [facility] Announcement We are excited to announce that we are making your provider's discharge notes available to you in [facility]. You will see these notes when they are completed and signed by the physician that discharged you from your recent hospital stay.   If you have any questions or concerns about any information you see in [facility], please call the Health Information Department where you were seen or reach out to your Primary Care Provider for more information about your plan of care. Introducing [facility] [place] [facility]! Dear Kandy Quiñones: Thank you for requesting a Operax account. Our records indicate that you already have an active Operax account. You can access your account anytime at [email or web address] ([email or web address] Options Did you know that you can access your hospital and ER discharge instructions at any time in Operax? You can also review all of your test results from your hospital stay or ER visit. Additional Information If you have questions, please visit the Frequently Asked Questions section of the Operax website at [email or web address] ([email or web address] Remember, Operax is NOT to be used for urgent needs. For medical emergencies, dial 911. Now available from your iPhone and Android! General Information Please provide this summary of care documentation to your next provider. Patient Signature:  ____________________________________________________________ Date:  ____________________________________________________________  
  
Larri Hazard Provider Signature:  ____________________________________________________________ Date:  ____________________________________________________________

## 2017-08-17 NOTE — ED NOTES
The Sepsis Screening has been completed on arrival in the Emergency Department.     Vital signs:  Patient Vitals for the past 4 hrs:   Temp Pulse Resp BP SpO2   08/17/17 0200 97.6 °F (36.4 °C) 79 17 (!) 103/32 100 %       MAP (Monitor): (!) 50    =monitored (data validate)       =calculated (manual entry)    Is patient is 29y/o or older, meets 2 or more ABNORMAL VITAL SIGNS below, associated with SUSPECTED INFECTION?  YES     Temperature < 96.8°F (36°C) or > 100.9°F (38.3°C)   Heart Rate > 90 beats per minute   Respiratory Rate > 20 beats per minute   BP < 90 systolic    MAP < 65    IF ANSWER IS YES, CALL A CODE SEPSIS  POC LACTIC ACID ASAP AND BEGIN NURSE DRIVEN SEPSIS ORDERS WHILE AWAITING PROVIDER

## 2017-08-17 NOTE — CONSULTS
INFECTIOUS DISEASE CONSULT NOTE     Requested by:Dr Wilburn      Reason:rt tonsillitis , HIV, MRSA BSI     ABX:     Current abx Prior abx    Zyvox 8/17 - 0  Teflaro x 4 weeks since 7/19, levoflox/ zosyn 8/17 x 1     ASSESSMENT: -- RECOMMENDATIONS      Rt sided tonsillitis without abscess   - ? Strep vs mono vs other viral infection  - on exam rt swollen tonsils with white patches on it, cervical LN present, pt with odynophagia. No thrush. - CT neck with tonsillitis, no abscess  - agree with zyvox for now  - check strep throat cx  - f/up Blcx - if no MRSA then change zyvox to augmentin  - check mononucleosis screen with EBV serology, CMV serology     Leukopenia with eosinophilia  - pt known to have chronic leukocytosis now with neutropenia/leukopenia   - also had 30% eosinophilia ? Drug reaction to Teflaro  - agree with d/c Teflaro - anyways 8/16 should have been last day of abx [ 4 weeks from 7/19 - per [facility] ]  - also ?if this is from viral infection  - monitor for now     Recent MRSA BSI from rt buttocks abscess at [facility]  - admitted 7/16-8/2, blcx 7/16 2/2 positive, repeat 7/19 neg   - s/p I and D   - PICC 7/24 placed, sent out on Teflaro for total 4 weeks   - buttock abscess almost resolved  - monitor Blcx here   - should d/c PICC before discharge    HIV since 1999  - Follows [facility]   - last CD4 607/ 19.6%, VL 5660 [ 6/28/17 ]  - ART - Tenofovir 25mg daily, emtricitabine 200mg q 72hr, raltegravir 400mg bid  - h/o non compliance, currently also non compliant , says did not get prescription of tenofovir, emtricitabine since discharge [ issue with pharmacy ], hence not taking any ART since discharge.    - will hold ART since not taking anyways, will ask to resume when she has all meds at home  - f/up [facility] after discharge [ has appointment on 8/22 ]   CKD stage 3  - baseline cr around 3 DM type 2 uncontrolled - hba1c 9.8%    CAD s/p CABG x 4 in 2015    Comorbid :- HTN, TIA, HLP      MICROBIOLOGY:   [ [facility] 7/16 Blcx - 2/2 MRSA, 7/19 Blcx x 2 NTD, TTE neg ]     8/17  Blcx x 2 - ip            Ucx - ip     LINES/DRAINS:   Rt chest PICC 7/24 at [facility]     HPI:  Pt is a 43yr old female with past medical h/o HIV diagnosed since 1999 follows [facility], CAD s/p CABG x 4 in 2015, DM type 2, HT, HLP, CKD stage 3, LUE superficial venous thrombosis, TIA who was recently at [facility] from 7/16 - 8/2 for rt buttock abscess leading to MRSA BSI. She had I and D performed there, was seen by [facility], blcx 7/16 pos MRSA, repeat 7/19 neg, TTE neg, she was discharged home on Iv Teflaro for 4 weeks , which she should have finished yesterday. She does have PICC on rt side of chest.   Per pt she started having rt neck pain, odynophagia since last 2 days, since she could not eat or drink due to this, she did not take her insulin either. She presented to [facility] for this and was found to have BS of 591. CT neck was done which is showing rt tonsillitis with no abscess, airway compromise moderate. We are asked to see her for abx recommendation. On asking about pt taking HIV meds , she says she did not get tenofovir or emtricitabine delivered at home after discharge hence she did not restart HIV meds at home.      Past Medical History:   Diagnosis Date    Carpal tunnel syndrome 4/30/2013    Depressed 4/30/2013    Diabetes ([address]     HIV (human immunodeficiency virus infection) ([address]     HIV (human immunodeficiency virus infection) ([address] 4/30/2013    HTN (hypertension) 4/30/2013    Hypertension     Migraine 8/12/2015    Pure hypercholesterolemia 4/30/2013    Stroke ([address] 8/12/2015    Type II or unspecified type diabetes mellitus without mention of complication, not stated as uncontrolled 4/30/2013       Past Surgical History:   Procedure Laterality Date    CARDIAC SURG PROCEDURE UNLIST  2015    2 vessel cabg    HX GYN          HX HEENT      Left Eye    HX ORTHOPAEDIC      Right Hand Carpal Tunnel        Allergies: Metformin and Vancomycin . Current Facility-Administered Medications   Medication Dose Route Frequency    insulin regular (NOVOLIN R, HUMULIN R) 100 Units in 0.9% sodium chloride 100 mL infusion  0-50 Units/hr IntraVENous TITRATE    glucose chewable tablet 16 g  4 Tab Oral PRN    glucagon (GLUCAGEN) injection 1 mg  1 mg IntraMUSCular PRN    dextrose (D50W) injection syrg 12.5-25 g  25-50 mL IntraVENous PRN    linezolid (ZYVOX) IVPB premix in D5W 600 mg  600 mg IntraVENous Q12H    NOREPINephrine (LEVOPHED) 8,000 mcg in dextrose 5% 250 mL infusion  2-16 mcg/min IntraVENous TITRATE    0.9% sodium chloride infusion  150 mL/hr IntraVENous CONTINUOUS    heparin (porcine) injection 5,000 Units  5,000 Units SubCUTAneous Q8H    morphine injection 2 mg  2 mg IntraVENous Q4H PRN       Family History   Problem Relation Age of Onset    Diabetes Mother     Stroke Mother     Diabetes Father      Social History     Social History    Marital status: LEGALLY      Spouse name: N/A    Number of children: N/A    Years of education: N/A     Occupational History    Not on file.      Social History Main Topics    Smoking status: Former Smoker     Packs/day: 1.50     Years: 2.00     Types: Cigarettes     Quit date: 2000    Smokeless tobacco: Never Used    Alcohol use 0.5 oz/week     1 Standard drinks or equivalent per week    Drug use: No    Sexual activity: No     Other Topics Concern    Not on file     Social History Narrative     History   Smoking Status    Former Smoker    Packs/day: 1.50    Years: 2.00    Types: Cigarettes    Quit date: 2000   Smokeless Tobacco    Never Used        Temp (24hrs), Av.2 °F (36.8 °C), Min:97.6 °F (36.4 °C), Max:98.8 °F (37.1 °C)    Visit Vitals    /50    Pulse 78    Temp 98.8 °F (37.1 °C)    Resp 21    Ht 5' 5\" (1.651 m)    Wt 105.9 kg (233 lb 7.5 oz)    SpO2 99%    BMI 38.85 kg/m2       ROS:A comprehensive review of systems was negative except for that written in the History of Present Illness. Physical Exam:    General: Well developed, obese 43 y.o.  female in no acute distress. ENT: ENT exam normal, no neck nodes or sinus tenderness  Head: normocephalic, without obvious abnormality  Mouth:  Enlarged rt tonsils with white patch over it   Neck: rt sided jugular lymphadenopathy   Cardio:  regular rate and rhythm, S1, S2 normal, no murmur  Chest: mild line scar from previous CABG, rt chest PICC intact - has come out 1-2 cm but no drainage or erythema  Lungs: clear to auscultation, no wheezes or rales and unlabored breathing  Abdomen: soft, non-tender. Bowel sounds normal. No masses, no organomegaly.   Extremities:  no redness or tenderness in the calves or thighs, no edema  Neuro: Grossly normal  Skin ; rt buttock area with almost healed abscess incision area, only small ulcer remaining, no surrounding induration or erythema       Labs: Results:   Chemistry Recent Labs      08/17/17   0709  08/17/17 0218   GLU  385*  591*   NA  135*  126*   K  4.2  4.4   CL  104  92*   CO2  21  25   BUN  51*  55*   CREA  3.21*  3.79*   CA  6.9*  7.4*   AGAP  10  9   BUCR  16  15   AP   --   103   TP   --   7.3   ALB   --   2.3*   GLOB   --   5.0*   AGRAT   --   0.5*      CBC w/Diff Recent Labs      08/17/17   0709  08/17/17 0218   WBC  1.3*  1.6*   RBC  3.38*  3.73*   HGB  9.4*  10.6*   HCT  27.5*  30.1*   PLT  245  291   GRANS  2*  3*   LYMPH  80*  57*   EOS  8*  30*      Microbiology Recent Labs      08/17/17 0218 08/17/17 0217   CULT  NO GROWTH AFTER 5 HOURS  NO GROWTH AFTER 5 HOURS          Leighann Huang MD  8/17/2017  11:39 AM  [facility]   [phone]

## 2017-08-17 NOTE — PROGRESS NOTES
[facility]  08/17/17 08/17 1901 - 08/18 0700  In: 150 [I.V.:150]  Out: 350 [Urine:350]    Last 3 Recorded Weights in this Encounter    08/17/17 0246 08/17/17 0641   Weight: 95.3 kg (210 lb) 105.9 kg (233 lb 7.5 oz)       Overnight Shift Events:  1915:   · Bedside and verbal shift change report received from Iam Fraga, off-going RN. Provider's instructions/meds/plan for current shift reviewed with pt by this writer; ongoing education rendered at this time. Rate verify on IV fluids/gtts. · Pt lying in bed, call bell within reach, bed in lowest position, side rails engaged x3 for pt safety; will continue to monitor pt.    1954:   · Paged Dr. Romulo Cantrell in regards to [address] switching pt to long acting insulin based off note from RD & pts BS's for past 4 hours. Awaiting return call. 2000:   · Return call to this RN by Dr. Romulo Cantrell. New orders received, verified with readback & entered. See eMAR/orders. Loretta Barclay d/c'd at this time by MD 2/2 BS's throughout day; pt now q6 accuchecks with SSI as appropriate. Will continue to monitor pt.     2123:   · Family provided with 4 digit patient code by this RN. Mother at bedside. 8115: Bedside and Verbal shift change report given to Margarita Peñaloza RN (oncoming nurse) by Violette Dunbar RN(offgoing nurse). Report included the following information SBAR, Kardex, Procedure Summary, Intake/Output, MAR and Recent Results.  Sinus Tachycardia            Date 08/16/17 1900 - 08/17/17 0659(Not Admitted) 08/17/17 0700 - 08/18/17 0659   Shift 8249-1095 24 Hour Total 3265-3055 7044-3657 24 Hour Total   I  N  T  A  K  E   I.V.  (mL/kg/hr) 2150 2150 1899.6  (1.5) 300 2199.6      Insulin Volume   74.6  74.6      Levophed Volume   0  0      Volume (0.9% sodium chloride infusion)   5522 178 5911      Volume (sodium chloride 0.9 % bolus infusion 1,000 mL) 2000 2000         Volume (levoFLOXacin (LEVAQUIN) 750 mg in D5W IVPB) 150 150 Volume (linezolid (ZYVOX) IVPB premix in D5W 600 mg)   300  300    Shift Total  (mL/kg) 2150  (20.3) 2150  (20.3) 1899.6  (17.9) 300  (2.8) 2199.6  (20.8)   O  U  T  P  U  T   Urine  (mL/kg/hr)   (1.2) 350 1840      Urine Output (mL) (Urinary Catheter 08/17/17 Nelson - Temperature)  350 1840    Shift Total  (mL/kg) 875  (8.3) 875  (8.3) 1490  (14.1) 350  (3.3) 1840  (17.4)   NET 1275 1275 409.6 -50 359.6   Weight (kg) 105.9 105.9 105.9 105.9 105.9

## 2017-08-17 NOTE — PROGRESS NOTES
Patient admitted this AM by Dr Zak Villalobos. She has sided tonsillitis without abscess in the setting of HIV and neutropenia. She is on antibiotics and ID is following. She does not have any respiratory distress or difficulty breathing but we will follow closely. Discussed with patient and mother at the bedside.

## 2017-08-17 NOTE — ED TRIAGE NOTES
Pt presents by EMS with neck pain and difficulty swallowing, lethargic, has PICC line in R chest for antibiotics. Pt has active MRSA infection. Pt states she did not eat all day today so she did not take her insulin.  BG - 598

## 2017-08-17 NOTE — Clinical Note
Status[de-identified] Inpatient [101] Type of Bed: Intensive Care [6] Inpatient Hospitalization Certified Necessary for the Following Reasons: 3. Patient receiving treatment that can only be provided in an inpatient setting (further clarification in H&P documentation) Admitting Diagnosis: Hyperglycemia [988071] Admitting Diagnosis: Hyponatremia [980844] Admitting Diagnosis: Anemia [467700] Admitting Diagnosis: SAVI (acute kidney injury) ([address] [9518004] Admitting Diagnosis: Dehydration [276.51. ICD-9-CM] Admitting Diagnosis: HIV (human immunodeficiency virus infection) [facility] [791390] Admitting Diagnosis: Dysphagia [9189212] Admitting Diagnosis: Leukopenia [701958] Admitting Diagnosis: Elevated troponin [1034132] Admitting Physician: Miguel Grewal Attending Physician: Miguel Grewal Estimated Length of Stay: 2 Midnights Discharge Plan[de-identified] Home with Office Follow-up

## 2017-08-17 NOTE — IP AVS SNAPSHOT
[address] [address] 
[phone] Patient: Mark MRN: [id number] [id number][id number] Current Discharge Medication List  
  
START taking these medications Dose & Instructions Dispensing Information Comments Morning Noon Evening Bedtime  
 clindamycin 300 mg capsule Commonly known as:  CLEOCIN Your last dose was: Your next dose is:    
   
   
 Dose:  300 mg Take 1 Cap by mouth three (3) times daily. Quantity:  24 Cap Refills:  0  
     
   
   
   
  
 lidocaine 2 % solution Commonly known as:  XYLOCAINE Your last dose was: Your next dose is:    
   
   
 Dose:  5 mL Take 5 mL by mouth three (3) times daily as needed for Pain (gargle and then spit out). Quantity:  1 Bottle Refills:  0  
     
   
   
   
  
 methylPREDNISolone 4 mg tablet Commonly known as:  Nataliya Fear Your last dose was: Your next dose is: Take as instructed Quantity:  1 Dose Pack Refills:  0  
     
   
   
   
  
 oxyCODONE-acetaminophen 5-325 mg per tablet Commonly known as:  PERCOCET Your last dose was: Your next dose is:    
   
   
 Dose:  1-2 Tab Take 1-2 Tabs by mouth every four (4) hours as needed. Max Daily Amount: 12 Tabs. Quantity:  12 Tab Refills:  0 CONTINUE these medications which have CHANGED Dose & Instructions Dispensing Information Comments Morning Noon Evening Bedtime  
 emtricitabine-tenofovir (TDF) 200-300 mg per tablet Commonly known as:  Efren Vazquez What changed:  how much to take Your last dose was: Your next dose is:    
   
   
 Dose:  1 Tab Take 1 Tab by mouth daily. Quantity:  30 Tab Refills:  0  
     
   
   
   
  
 metoprolol tartrate 100 mg IR tablet Commonly known as:  LOPRESSOR What changed:  medication strength Your last dose was: Your next dose is:    
   
   
 Dose:  100 mg Take 1 Tab by mouth two (2) times a day. Quantity:  60 Tab Refills:  0 CONTINUE these medications which have NOT CHANGED Dose & Instructions Dispensing Information Comments Morning Noon Evening Bedtime  
 amitriptyline 100 mg tablet Commonly known as:  ELAVIL Your last dose was: Your next dose is:    
   
   
 Dose:  100 mg Take 100 mg by mouth nightly. Refills:  0  
     
   
   
   
  
 aspirin 81 mg chewable tablet Your last dose was: Your next dose is:    
   
   
 Dose:  81 mg Take 81 mg by mouth daily. Refills:  0  
     
   
   
   
  
 atorvastatin 80 mg tablet Commonly known as:  LIPITOR Your last dose was: Your next dose is:    
   
   
 Dose:  80 mg Take 1 Tab by mouth daily. Quantity:  30 Tab Refills:  0 Blood-Glucose Meter monitoring kit Your last dose was: Your next dose is: Any reliable brand -- test twice a day -- dx 250.00 Quantity:  1 kit Refills:  0  
     
   
   
   
  
 clopidogrel 75 mg Tab Commonly known as:  PLAVIX Your last dose was: Your next dose is:    
   
   
 Dose:  75 mg Take 1 Tab by mouth daily. Quantity:  30 Tab Refills:  0  
     
   
   
   
  
 glucose blood VI test strips strip Commonly known as:  blood glucose test  
   
Your last dose was: Your next dose is:    
   
   
 Test twice a day -- dispense any reliable brand to work with patients glucometer Quantity:  1 Package Refills:  11 Insulin Needles (Disposable) 29 gauge x 1/2\" Ndle Commonly known as:  BD INSULIN PEN NEEDLE UF ORIG Your last dose was: Your next dose is:    
   
   
 Diagnosis 250.00 use once daily Quantity:  100 Each Refills:  12 LANTUS SOLOSTAR 100 unit/mL (3 mL) Inpn Generic drug:  insulin glargine Your last dose was: Your next dose is: START 74 UNITS TWO TIMES A DAY . INCREASE BY 1 UNITS AM AND PM EVERY 3 DAYS UNTIL FBG IS CONSISTENTLY 150 Quantity:  5 Each Refills:  0  
     
   
   
   
  
 losartan 50 mg tablet Commonly known as:  COZAAR Your last dose was: Your next dose is:    
   
   
 Dose:  50 mg Take 1 Tab by mouth daily. Quantity:  30 Tab Refills:  0  
     
   
   
   
  
 NIFEdipine ER 30 mg ER tablet Commonly known as:  NIFEDICAL XL Your last dose was: Your next dose is:    
   
   
 Dose:  30 mg Take 1 Tab by mouth daily. Quantity:  30 Tab Refills:  0  
     
   
   
   
  
 raltegravir 400 mg tablet Commonly known as:  ISENTRESS Your last dose was: Your next dose is:    
   
   
 Dose:  400 mg Take 1 Tab by mouth two (2) times a day. Quantity:  60 Tab Refills:  0 SITagliptin 100 mg tablet Commonly known as:  Kwabena Duquer Your last dose was: Your next dose is:    
   
   
 Dose:  100 mg Take 1 Tab by mouth daily. Quantity:  30 Tab Refills:  0 STOP taking these medications HYDROcodone-acetaminophen 5-325 mg per tablet Commonly known as:  Lashae Pratt Where to Get Your Medications These medications were sent to [facility] [address] [place] [address], [address] [place] [address] Drive  [address] [address] [facility], [address] [facility] [address] Phone:  [phone]  
  atorvastatin 80 mg tablet  
 clindamycin 300 mg capsule  
 clopidogrel 75 mg Tab  
 emtricitabine-tenofovir (TDF) 200-300 mg per tablet  
 lidocaine 2 % solution  
 losartan 50 mg tablet  
 methylPREDNISolone 4 mg tablet  
 metoprolol tartrate 100 mg IR tablet NIFEdipine ER 30 mg ER tablet  
 raltegravir 400 mg tablet SITagliptin 100 mg tablet Information on where to get these meds will be given to you by the nurse or doctor. ! Ask your nurse or doctor about these medications  
  oxyCODONE-acetaminophen 5-325 mg per tablet

## 2017-08-17 NOTE — PROGRESS NOTES
[facility]   Discharge Planning/ Assessment    Reasons for Intervention: Chart reviewed and pt verified demographics, She [place] a and b, no secondary. PCP is Dr Jessica Pete, last seen August 8th. Her phone number is [phone]. She lives with children 23 and 15 yr old. She says she is independent with ADL. Had [facility] once but the lady made her mad by talking down to her and she stopped her coming. She has a cane at home. Her paln is home.     High Risk Criteria  [x] Yes  []No   Physician Referral  [] Yes  [x]No        Date    Nursing Referral  [] Yes  [x]No        Date    Patient/Family Request  [] Yes  [x]No        Date       Resources:    Medicare  [x] Yes  []No   Medicaid  [] Yes  [x]No   No Resources  [] Yes  [x]No   Private Insurance  [] Yes  [x]No    Name/Phone Number    Other  [] Yes  [x]No        (i.e. Workman's Comp)         Prior Services:    Prior Services  [] Yes  [x]No   Home Health  [] Yes  [x]No   [address]  [] Yes  [x]No        [address] of [address] Program  [] Yes  [x]No       Meals on Wheels  [] Yes  [x]No   Office on Aging  [] Yes  [x]No   Transportation Services  [] Yes  [x]No   Nursing Home  [] Yes  [x]No        Nursing Home Name    [address]  [] Yes  [x]No        [address] Name    Other       Information Source:      Information obtained from  [x] Patient  [] Parent   [] [address]   [] Child  [] Spouse   [] Significant Other/Partner   [] Friend      [] EMS    [] Nursing Home Chart          [] Other:   Chart Review  [x] Yes  []No     Family/Support System:    Patient lives with  [] Alone    [] Spouse   [] Significant Other  [x] Children  [] Caretaker   [] Parent  [] Sibling     [] Other       Other Support System:    Is the patient responsible for care of others  [x] Yes  []No   Information of person caring for patient on  discharge    Managers financial affairs independently  [x] Yes  []No   If no, explain: Status Prior to Admission:    Mental Status  [x] Awake  [] Alert  [] Oriented  [] Quiet/Calm [] Lethargic/Sedated   [] Disoriented  [] Restless/Anxious  [] Combative   Personal Care  [] Dependent  [x] Independent Personal Care  [] Requires Assistance   Meal Preparation Ability  [x] Independent   [] Standby Assistance   [] Minimal Assistance   [] Moderate Assistance  [] Maximum Assistance     [] Total Assistance   Chores  [x] Independent with Chores   [] N/A Nursing Home Resident   [] Requires Assistance   Bowel/Bladder  [x] Continent  [] Catheter  [] Incontinent  [] Ostomy Self-Care    [] Urine Diversion Self-Care  [] Maximum Assistance     [] Total Assistance   Number of Persons needed for assistance    DME at home  [] [address] [facility], Idania Distel  [] [address] [facility], Straight   [] Commode    [] Bathroom/Grab Bars  [] Hospital Bed  [] Nebulizer  [] Oxygen           [] Raised Toilet Seat  [] Shower Chair  [] Side Rails for Bed   [] Tub Transfer Bench   [] Maya Felipe  [] Ronit Watkins, Sean      [] Other:   Vendor      Treatment Presently Receiving:    Current Treatments  [] Chemotherapy  [] Dialysis  [] Insulin  [] IVAB [x] IVF   [] O2  [] PCA   [] PT   [] RT   [] Tube Feedings   [] Wound Care     Psychosocial Evaluation:    Verbalized Knowledge of Disease Process  [] Patient  []Family   Coping with Disease Process  [] Patient  []Family   Requires Further Counseling Coping with Disease Process  [] Patient  []Family     Identified Projected Needs:    Home Health Aid  [] Yes  [x]No   Transportation  [] Yes  [x]No   Education  [] Yes  [x]No        Specific Education     Financial Counseling  [] Yes  [x]No   Inability to Care for Self/Will Require 24 hour care  [] Yes  [x]No   Pain Management  [] Yes  [x]No   Home Infusion Therapy  [] Yes  [x]No   Oxygen Therapy  [] Yes  [x]No   DME  [] Yes  [x]No   Long Term Care Placement  [] Yes  [x]No   Rehab  [] Yes  [x]No   Physical Therapy  [] Yes  [x]No   Needs Anticipated At This Time  [] Yes  [x]No Intra-Hospital Referral:    [address]  [] Yes  [x]No     [] Yes  [x]No   Patient Representative  [] Yes  [x]No   Staff for Teaching Needs  [] Yes  [x]No   Specialty Teaching Needs     Diabetic Educator  [] Yes  [x]No   Referral for Diabetic Educator Needed  [] Yes  [x]No  If Yes, place order for Nutritionist or Diabetic Consult     Tentative Discharge Plan:    Home with No Services  [x] Yes  []No   Home with [address]  [] Yes  [x]No        If Yes, specify type    Home Care Program  [] Yes  [x]No        If Yes, specify type    Meals on Wheels  [] Yes  [x]No   Office of Aging  [] Yes  [x]No   NHP  [] Yes  [x]No   Return to the Nursing Home  [] Yes  [x]No   Rehab Therapy  [] Yes  [x]No   Acute Rehab  [] Yes  [x]No   Subacute Rehab  [] Yes  [x]No   Private Care  [] Yes  [x]No   Substance Abuse Referral  [] Yes  [x]No   Transportation  [] Yes  [x]No   Chore Service  [] Yes  [x]No   Inpatient Hospice  [] Yes  [x]No   OP RT  [] Yes  [x] No   OP Hemo  [] Yes  [x] No   OP PT  [] Yes  [x]No   Support Group  [] Yes  [x]No   Reach to Recovery  [] Yes  [x]No   OP Oncology Clinic  [] Yes  [x]No   Clinic Appointment  [] Yes  [x]No   DME  [] Yes  [x]No   Comments    Name of D/C Planner or  Given to Patient or Family Elvis Cristina. Brea Moreira RN   Phone Number         [id number] [phone]   Date 08/17/17   Time    If you are discharged home, whom do you designate to participate in your discharge plan and receive any information needed?      Enter name of designee brother        Phone # of designee         Address of designee         Updated         Patient refused to designate any           individual

## 2017-08-17 NOTE — H&P
History and Physical    Patient: Mehran Kraft               Sex: female          DOA: 8/17/2017       YOB: 1975      Age:  43 y.o.        LOS:  LOS: 0 days        Chief Complaint   Patient presents with    Neck Pain    Lethargy         HPI:     Mehran Kraft is a 43 y.o. female who presents with neck pain , dysphagia . Pain onset was yesterday. She claims it has been painful swallowing both liquids and solids and as a result has had nothing to eat and very little to drink since yesterday. Patient also did not take her insulin since she has was not eating. She was recently hospitalized at  [facility]  And was discharged home with a Picc line  for Teflaro IV twice daily . She has HIV and claims she is compliant to her antiretroviral medications. She denies fever , chills. In the ED she had blood glucose 591 with no AG. She was started on insulin drip and IVF Normal saline bolus 3 Liters . She also c/o Right neck pain and she a had CT neck done with preliminary report showing a paramedian soft tissue lipoma but no neck abscess. Patient is leukopenic with WBC 1.6 which is recent. Patient was also started on Zosyn, Xyvox , Levaquin. Patient will be admitted for further treatment , and I will be holding off on continuing her teflaro due to leukopenia. Recent blood culture at [facility] was positive for MRSA but sensitive to some antibiotics  including Xyvox. Patient informed me that she had told the ED physician that she would like to be transferred to [facility] since all her treatment have been there . The  ED physician contacted ED team at [address] [facility] but they where not able to accept patient because they were at capacity and are not accepting transfers currently.        Past Medical History:   Diagnosis Date    Carpal tunnel syndrome 4/30/2013    Depressed 4/30/2013    [facility] ([facility]     [facility]     HIV (human immunodeficiency virus infection) ([facility] 2013    HTN (hypertension) 2013    Hypertension     Migraine 2015    Pure hypercholesterolemia 2013    Stroke ([facility] 2015    Type II or unspecified type diabetes mellitus without mention of complication, not stated as uncontrolled 2013   . Past Surgical History:   Procedure Laterality Date    CARDIAC SURG PROCEDURE UNLIST      2 vessel cabg    HX GYN  2005        HX HEENT      Left Eye    HX ORTHOPAEDIC      Right Hand Carpal Tunnel        No current facility-administered medications on file prior to encounter. Current Outpatient Prescriptions on File Prior to Encounter   Medication Sig Dispense Refill    LANTUS SOLOSTAR 100 unit/mL (3 mL) pen START 74 UNITS TWO TIMES A DAY . INCREASE BY 1 UNITS AM AND PM EVERY 3 DAYS UNTIL FBG IS CONSISTENTLY 150 5 Each 0    HYDROcodone-acetaminophen (NORCO) 5-325 mg per tablet Take 1 Tab by mouth every eight (8) hours as needed for Pain. Max Daily Amount: 3 Tabs. 30 Tab 0    losartan (COZAAR) 50 mg tablet Take 50 mg by mouth daily.  amitriptyline (ELAVIL) 100 mg tablet Take 100 mg by mouth nightly.  sitaGLIPtin (JANUVIA) 100 mg tablet Take 1 Tab by mouth daily. 90 Tab 4    Insulin Needles, Disposable, (BD INSULIN PEN NEEDLE UF ORIG) 29 gauge X 1/2 \" ndle Diagnosis 250.00 use once daily 100 Each 12    glucose blood VI test strips (BLOOD GLUCOSE TEST) strip Test twice a day -- dispense any reliable brand to work with patients glucometer 1 Package 11    atorvastatin (LIPITOR) 80 mg tablet Take 80 mg by mouth daily.  clopidogrel (PLAVIX) 75 mg tablet Take 75 mg by mouth daily.  aspirin 81 mg chewable tablet Take 81 mg by mouth daily.  raltegravir (ISENTRESS) 400 mg tablet Take 400 mg by mouth two (2) times a day.  metoprolol (LOPRESSOR) 50 mg tablet Take 100 mg by mouth two (2) times a day.  NIFEdipine ER (NIFEDICAL XL) 30 mg ER tablet Take 30 mg by mouth daily.       Blood-Glucose Meter monitoring kit Any reliable brand -- test twice a day -- dx 250.00 1 kit 0    emtricitabine-tenofovir (TRUVADA) 200-300 mg per tablet Take  by mouth daily. Social History     Social History    Marital status: LEGALLY      Spouse name: N/A    Number of children: N/A    Years of education: N/A     Occupational History    Not on file. Social History Main Topics    Smoking status: Former Smoker     Packs/day: 1.50     Years: 2.00     Types: Cigarettes     Quit date: 4/30/2000    Smokeless tobacco: Never Used    Alcohol use 0.5 oz/week     1 Standard drinks or equivalent per week    Drug use: No    Sexual activity: No     Other Topics Concern    Not on file     Social History Narrative       Prior to Admission Medications   Prescriptions Last Dose Informant Patient Reported? Taking? Blood-Glucose Meter monitoring kit   No No   Sig: Any reliable brand -- test twice a day -- dx 250.00   HYDROcodone-acetaminophen (NORCO) 5-325 mg per tablet   No No   Sig: Take 1 Tab by mouth every eight (8) hours as needed for Pain. Max Daily Amount: 3 Tabs. Insulin Needles, Disposable, (BD INSULIN PEN NEEDLE UF ORIG) 29 gauge X 1/2 \" ndle   No No   Sig: Diagnosis 250.00 use once daily   LANTUS SOLOSTAR 100 unit/mL (3 mL) pen   No No   Sig: START 74 UNITS TWO TIMES A DAY . INCREASE BY 1 UNITS AM AND PM EVERY 3 DAYS UNTIL FBG IS CONSISTENTLY 150   NIFEdipine ER (NIFEDICAL XL) 30 mg ER tablet   Yes No   Sig: Take 30 mg by mouth daily. amitriptyline (ELAVIL) 100 mg tablet   Yes No   Sig: Take 100 mg by mouth nightly. aspirin 81 mg chewable tablet   Yes No   Sig: Take 81 mg by mouth daily. atorvastatin (LIPITOR) 80 mg tablet   Yes No   Sig: Take 80 mg by mouth daily. clopidogrel (PLAVIX) 75 mg tablet   Yes No   Sig: Take 75 mg by mouth daily. emtricitabine-tenofovir (TRUVADA) 200-300 mg per tablet   Yes No   Sig: Take  by mouth daily.    glucose blood VI test strips (BLOOD GLUCOSE TEST) strip   No No   Sig: Test twice a day -- dispense any reliable brand to work with patients glucometer   losartan (COZAAR) 50 mg tablet   Yes No   Sig: Take 50 mg by mouth daily. metoprolol (LOPRESSOR) 50 mg tablet   Yes No   Sig: Take 100 mg by mouth two (2) times a day. raltegravir (ISENTRESS) 400 mg tablet   Yes No   Sig: Take 400 mg by mouth two (2) times a day. sitaGLIPtin (JANUVIA) 100 mg tablet   No No   Sig: Take 1 Tab by mouth daily. Facility-Administered Medications: None       Family History   Problem Relation Age of Onset    Diabetes Mother     Stroke Mother     Diabetes Father        Allergies   Allergen Reactions    Metformin Other (comments)     Stopped because of heart disease by cardiologist      Vancomycin Itching and Swelling       Review of Systems   Constitutional: Negative. HENT:        Dysphagia   Eyes: Negative. Respiratory: Negative. Cardiovascular: Negative. Gastrointestinal: Negative. Genitourinary: Negative. Musculoskeletal: Positive for neck pain. Skin: Negative. Neurological: Negative. Endo/Heme/Allergies: Negative. Psychiatric/Behavioral: Negative. Physical Exam:       Visit Vitals    /56    Pulse 82    Temp 97.6 °F (36.4 °C)    Resp 22    Ht 5' 5\" (1.651 m)    Wt 105.9 kg (233 lb 7.5 oz)    SpO2 100%    BMI 38.85 kg/m2       Physical Exam   Constitutional: She is oriented to person, place, and time. She appears well-developed and well-nourished. She appears ill. She appears distressed. HENT:   Head: Normocephalic and atraumatic. Eyes: Conjunctivae and EOM are normal. Pupils are equal, round, and reactive to light. No scleral icterus. Neck: Neck supple. No rigidity. No thyroid mass present. Cardiovascular: Normal rate, regular rhythm and normal heart sounds. No murmur heard. Pulmonary/Chest: Effort normal and breath sounds normal. No respiratory distress. She has no wheezes. She has no rales. Abdominal: Soft. Bowel sounds are normal. She exhibits no distension. There is no tenderness. There is no guarding. Musculoskeletal: She exhibits no edema. Neurological: She is alert and oriented to person, place, and time. Skin: Skin is warm. Rash noted. She is not diaphoretic. No erythema. No pallor. Psychiatric: She has a normal mood and affect. Ancillary Studies: All lab and imaging reviewed for the past 24 hours. Recent Results (from the past 24 hour(s))   GLUCOSE, POC    Collection Time: 08/17/17  2:02 AM   Result Value Ref Range    Glucose (POC) 598 (HH) 70 - 110 mg/dL   CBC WITH AUTOMATED DIFF    Collection Time: 08/17/17  2:18 AM   Result Value Ref Range    WBC 1.6 (L) 4.6 - 13.2 K/uL    RBC 3.73 (L) 4.20 - 5.30 M/uL    HGB 10.6 (L) 12.0 - 16.0 g/dL    HCT 30.1 (L) 35.0 - 45.0 %    MCV 80.7 74.0 - 97.0 FL    MCH 28.4 24.0 - 34.0 PG    MCHC 35.2 31.0 - 37.0 g/dL    RDW 16.6 (H) 11.6 - 14.5 %    PLATELET 548 834 - 544 K/uL    MPV 11.1 9.2 - 11.8 FL    NEUTROPHILS 3 (L) 40 - 73 %    LYMPHOCYTES 57 (H) 21 - 52 %    MONOCYTES 5 3 - 10 %    EOSINOPHILS 30 (H) 0 - 5 %    BASOPHILS 5 (H) 0 - 2 %    ABS. NEUTROPHILS 0.1 (L) 1.8 - 8.0 K/UL    ABS. LYMPHOCYTES 0.9 0.9 - 3.6 K/UL    ABS. MONOCYTES 0.1 0.05 - 1.2 K/UL    ABS. EOSINOPHILS 0.5 (H) 0.0 - 0.4 K/UL    ABS.  BASOPHILS 0.1 (H) 0.0 - 0.06 K/UL    DF AUTOMATED     PROTHROMBIN TIME + INR    Collection Time: 08/17/17  2:18 AM   Result Value Ref Range    Prothrombin time 13.2 11.5 - 15.2 sec    INR 1.0 0.8 - 1.2     METABOLIC PANEL, COMPREHENSIVE    Collection Time: 08/17/17  2:18 AM   Result Value Ref Range    Sodium 126 (L) 136 - 145 mmol/L    Potassium 4.4 3.5 - 5.5 mmol/L    Chloride 92 (L) 100 - 108 mmol/L    CO2 25 21 - 32 mmol/L    Anion gap 9 3.0 - 18 mmol/L    Glucose 591 (HH) 74 - 99 mg/dL    BUN 55 (H) 7.0 - 18 MG/DL    Creatinine 3.79 (H) 0.6 - 1.3 MG/DL    BUN/Creatinine ratio 15 12 - 20      GFR est AA 16 (L) >60 ml/min/1.73m2 GFR est non-AA 13 (L) >60 ml/min/1.73m2    Calcium 7.4 (L) 8.5 - 10.1 MG/DL    Bilirubin, total 1.0 0.2 - 1.0 MG/DL    ALT (SGPT) 19 13 - 56 U/L    AST (SGOT) 5 (L) 15 - 37 U/L    Alk. phosphatase 103 45 - 117 U/L    Protein, total 7.3 6.4 - 8.2 g/dL    Albumin 2.3 (L) 3.4 - 5.0 g/dL    Globulin 5.0 (H) 2.0 - 4.0 g/dL    A-G Ratio 0.5 (L) 0.8 - 1.7     MAGNESIUM    Collection Time: 08/17/17  2:18 AM   Result Value Ref Range    Magnesium 2.2 1.6 - 2.6 mg/dL   HCG QL SERUM    Collection Time: 08/17/17  2:18 AM   Result Value Ref Range    HCG, Ql. NEGATIVE  NEG     CARDIAC PANEL,(CK, CKMB & TROPONIN)    Collection Time: 08/17/17  2:18 AM   Result Value Ref Range    CK 32 26 - 192 U/L    CK - MB <1.0 <3.6 ng/ml    CK-MB Index  0.0 - 4.0 %     CALCULATION NOT PERFORMED WHEN RESULT IS BELOW LINEAR LIMIT    Troponin-I, Qt. 0.05 (H) 0.0 - 0.045 NG/ML   TSH 3RD GENERATION    Collection Time: 08/17/17  2:18 AM   Result Value Ref Range    TSH 1.32 0.36 - 3.74 uIU/mL   HEMOGLOBIN A1C WITH EAG    Collection Time: 08/17/17  2:18 AM   Result Value Ref Range    Hemoglobin A1c 9.8 (H) 4.2 - 5.6 %    Est. average glucose 235 mg/dL   POC VENOUS BLOOD GAS    Collection Time: 08/17/17  2:19 AM   Result Value Ref Range    Device: ROOM AIR      FIO2 (POC) 21 %    pH, venous (POC) 7.404 7.32 - 7.42      pCO2, venous (POC) 40.4 (L) 41 - 51 MMHG    pO2, venous (POC) 32 25 - 40 mmHg    HCO3, venous (POC) 25.4 23.0 - 28.0 MMOL/L    sO2, venous (POC) 63 (L) 65 - 88 %    Base excess, venous (POC) 1 mmol/L    Allens test (POC) N/A      Total resp.  rate 27      Site OTHER      Patient temp. 97.6      Specimen type (POC) VENOUS BLOOD      Performed by Kirby Greenberg    [facility]    Collection Time: 08/17/17  2:20 AM   Result Value Ref Range    CO2, POC 23 19 - 24 MMOL/L    Glucose,  (HH) 74 - 106 MG/DL    BUN, POC 55 (H) 7 - 18 MG/DL    Creatinine, POC 3.6 (H) 0.6 - 1.3 MG/DL    GFRAA, POC 17 (L) >60 ml/min/1.73m2    GFRNA, POC 14 (L) >60 ml/min/1.73m2    Sodium,  (L) 136 - 145 MMOL/L    Potassium, POC 4.7 3.5 - 5.5 MMOL/L    Calcium, ionized (POC) 0.97 (L) 1.12 - 1.32 MMOL/L    Chloride, POC 92 (L) 100 - 108 MMOL/L    Anion gap, POC 18 10 - 20      Hematocrit, POC 30 (L) 36 - 49 %    Hemoglobin, POC 10.2 (L) 12 - 16 G/DL   POC LACTIC ACID    Collection Time: 08/17/17  2:27 AM   Result Value Ref Range    Lactic Acid (POC) 1.2 0.4 - 2.0 mmol/L   EKG, 12 LEAD, INITIAL    Collection Time: 08/17/17  2:43 AM   Result Value Ref Range    Ventricular Rate 78 BPM    Atrial Rate 78 BPM    P-R Interval 182 ms    QRS Duration 82 ms    Q-T Interval 414 ms    QTC Calculation (Bezet) 471 ms    Calculated P Axis 32 degrees    Calculated R Axis 0 degrees    Calculated T Axis 95 degrees    Diagnosis       Normal sinus rhythm  Septal infarct , age undetermined  Abnormal ECG  No previous ECGs available     GLUCOSTABILIZER    Collection Time: 08/17/17  3:46 AM   Result Value Ref Range    Glucose 591 mg/dL    Insulin order 10.6 units/hour    Insulin adminstered 10.6 units/hour    Multiplier 0.020     Low target 140 mg/dL    High target 180 mg/dL    D50 order 0.0 ml    D50 administered 0.00 ml    Minutes until next BG 60 min    Order initials tcr     Administered initials tcr    URINALYSIS W/ RFLX MICROSCOPIC    Collection Time: 08/17/17  4:35 AM   Result Value Ref Range    Color YELLOW      Appearance CLEAR      Specific gravity 1.022 1.005 - 1.030      pH (UA) 6.0 5.0 - 8.0      Protein 300 (A) NEG mg/dL    Glucose >1000 (A) NEG mg/dL    Ketone NEGATIVE  NEG mg/dL    Bilirubin NEGATIVE  NEG      Blood NEGATIVE  NEG      Urobilinogen 0.2 0.2 - 1.0 EU/dL    Nitrites NEGATIVE  NEG      Leukocyte Esterase NEGATIVE  NEG     URINE MICROSCOPIC ONLY    Collection Time: 08/17/17  4:35 AM   Result Value Ref Range    WBC 0 to 3 0 - 4 /hpf    RBC 0 to 3 0 - 5 /hpf    Epithelial cells 1+ 0 - 5 /lpf    Bacteria 1+ (A) NEG /hpf    Amorphous Crystals 2+ (A) NEG   GLUCOSTABILIZER Collection Time: 08/17/17  4:50 AM   Result Value Ref Range    Glucose 563 mg/dL    Insulin order 15.1 units/hour    Insulin adminstered 15.1 units/hour    Multiplier 0.030     Low target 140 mg/dL    High target 180 mg/dL    D50 order 0.0 ml    D50 administered 0.00 ml    Minutes until next BG 60 min    Order initials clp     Administered initials clp    GLUCOSE, POC    Collection Time: 08/17/17  4:52 AM   Result Value Ref Range    Glucose (POC) 565 (HH) 70 - 110 mg/dL   GLUCOSE, POC    Collection Time: 08/17/17  5:55 AM   Result Value Ref Range    Glucose (POC) 474 (HH) 70 - 110 mg/dL   GLUCOSTABILIZER    Collection Time: 08/17/17  5:56 AM   Result Value Ref Range    Glucose 474 mg/dL    Insulin order 8.3 units/hour    Insulin adminstered 8.3 units/hour    Multiplier 0.020     Low target 140 mg/dL    High target 180 mg/dL    D50 order 0.0 ml    D50 administered 0.00 ml    Minutes until next BG 60 min    Order initials tcr     Administered initials tcr    GLUCOSE, POC    Collection Time: 08/17/17  6:46 AM   Result Value Ref Range    Glucose (POC) 437 (HH) 70 - 110 mg/dL   GLUCOSTABILIZER    Collection Time: 08/17/17  6:48 AM   Result Value Ref Range    Glucose 437 mg/dL    Insulin order 11.3 units/hour    Insulin adminstered 11.3 units/hour    Multiplier 0.030     Low target 140 mg/dL    High target 180 mg/dL    D50 order 0.0 ml    D50 administered 0.00 ml    Minutes until next BG 60 min    Order initials cw     Administered initials cw        Assessment/Plan     Principal Problem:    Neck pain on right side (8/17/2017)    Active Problems:    [facility] virus [facility]) [facility] (4/30/2013)      Dehydration (8/17/2017)      Hyponatremia (8/17/2017)      Leukopenia (8/17/2017)      Anemia (8/17/2017)      Hyperglycemia (8/17/2017)      Dysphagia (8/17/2017)      Elevated troponin (8/17/2017)      SAVI (acute kidney injury) ([facility] (8/17/2017)      MRSA (methicillin resistant staph aureus) culture positive (8/17/2017)      Eosinophilia (8/17/2017)        PLAN:    - Continue IV Insulin for blood glucose control   - Monitor glucose level hourly   - Monitor BMP every 4 hours   - Neck Pain control prn  - Recommend GI consult in AM for dysphagia   - Recommend ID consult for HIV ,MRSA bacteremia   - Hold Teflaro home dose due to Leukopenia . Substitute with Xyvox   - Trend Troponin LIKELY 2/2 SAVI/CKD  - Follow CT neck final report. May need consult Surgery for neck pain   - Eosinophilia likely 2/2 HIV vs OPPURTUNISTIC infection . Recommend ID consult .  Follow CBC  - Monitor CBC for leukopenia like side effect of Teflaro   - Anemia of chronic disease , continue to monitor   - SAVI  Likely 2/2 dehydration  CKD   - Dehydration 2/2 hypovolemia , hydrate IVF   - Hyponatremia due to hyperglycemia , I expect improvement with blood glucose control  - Resume chronic medications as appropriate when able to take PO  - DVT prophylaxis   - Full code     Mirian Short MD  8/17/2017  5:09 AM

## 2017-08-17 NOTE — ACP (ADVANCE CARE PLANNING)
Patient has designated _____________brother___________ to participate in his/her discharge plan and to receive any needed information.      Name: Ally Casey  Address:  Phone [phone]

## 2017-08-17 NOTE — PROGRESS NOTES
[facility] Progress Note  08/17/17 08/16 1901 - 08/17 0700  In: 2150 [I.V.:2150]  Out: 875 [Urine:875]    Last 3 Recorded Weights in this Encounter    08/17/17 0246 08/17/17 0641   Weight: 95.3 kg (210 lb) 105.9 kg (233 lb 7.5 oz)       Overnight Shift Events:  4209:   · Bedside report received from Dominique Ly [address] transporting ED RN. Provider's instructions/meds/plan for current shift reviewed with pt by this writer; ongoing education rendered at this time. Pt oriented to ICU. Rate verify on IV fluids/gtts. · Pt bathed, linens changed & pt hooked up to monitor. · Pt lying in bed, call bell within reach, bed in lowest position, side rails engaged x3 for pt safety; will continue to monitor pt. · Pt placed on contact isolation for active MRSA infection to Right buttocks abscess - admitting provider placed order for wound care consult per ED RN. Wound cleansed and dry dressing placed over wound until consult can be done by wound care. 5346:   · Accucheck completed at this time with BS reading of 437; insulin gtt adjusted per glucostabilizer with dual RN sign off.       0715: Bedside and Verbal shift change report given to Melanie Cruz RN (oncoming nurse) by Amisha Rao RN(offgoing nurse). Report included the following information SBAR, Kardex, Procedure Summary, Intake/Output, MAR and Recent Results.  Normal Sinus Rhythm         Date 08/16/17 0700 - 08/17/17 0659(Not Admitted) 08/17/17 0700 - 08/18/17 0659   Shift 6511-7354 8444-3611 24 Hour Total 6263-1606 7729-8366 24 Hour Total   I  N  T  A  K  E   I.V.  2150 2150         Volume (sodium chloride 0.9 % bolus infusion 1,000 mL)  2000 2000         Volume (levoFLOXacin (LEVAQUIN) 750 mg in D5W IVPB)  150 150       Shift Total  (mL/kg)  2150  (20.3) 2150  (20.3)      O  U  T  P  U  T   Urine  875 875         Urine Output (mL) (Urinary Catheter 08/17/17 Nelson - Temperature)  875 875       Shift Total  (mL/kg)  875  (8.3) 875  (8.3) NET  7400 8114      Weight (kg)  105.9 105.9 105.9 105.9 105.9

## 2017-08-17 NOTE — PROGRESS NOTES
ICU AM  (Multidisciplinary/ Interdisciplinary) Rounds   8/17/2017  Observed patient & discussed case w/ BSRN. Main dx/ reason for SDU admission: neck pain, possible tonsillitits, DM, HIV, Leukopenia    VSS Afeb, Room Air    Management by Hospitalist. On abx. ID hs been consulted. INTERVENTIONS:    PLAN:  PCCM available as needed.  Please call for consult, questions or concerns.     -suzy   pager: [phone]

## 2017-08-17 NOTE — ED PROVIDER NOTES
HPI Comments: Eddie Merritt is a 43 y.o. Female who had recent prolonged hospital stay at [address] [facility] for mrsa bactermia, sec to buttocks abscess. Pt was d/c home with picc, on home tefloran. Pt states she has not felt well since yesterday with dec appetite, so did not take her insulin. Thirsty with no polyuria. No fever, cough, vomiting. Passed out at home and was noted to be hypotensive per ems who started peripheral line and started fluids. Sx worse with swallowing. No sore throat. Also with h/o hiv with which she has been compliant with meds    The history is provided by the patient and medical records. Past Medical History:   Diagnosis Date    Carpal tunnel syndrome 2013    Depressed 2013    [facility] [address]     HIV [facility]human immunodeficiency virus infection) [facility]     HIV (human immunodeficiency virus infection) [facility] [address] 2013    HTN (hypertension) 2013    Hypertension     Migraine 2015    Pure hypercholesterolemia 2013    Stroke ([facility] [address] 2015    Type II or unspecified type diabetes mellitus without mention of complication, not stated as uncontrolled 2013       Past Surgical History:   Procedure Laterality Date    CARDIAC SURG PROCEDURE UNLIST      2 vessel cabg    HX GYN          HX HEENT      Left Eye    HX ORTHOPAEDIC      Right Hand Carpal Tunnel          Family History:   Problem Relation Age of Onset    Diabetes Mother     Stroke Mother     Diabetes Father        Social History     Social History    Marital status: LEGALLY      Spouse name: N/A    Number of children: N/A    Years of education: N/A     Occupational History    Not on file.      Social History Main Topics    Smoking status: Former Smoker     Packs/day: 1.50     Years: 2.00     Types: Cigarettes     Quit date: 2000    Smokeless tobacco: Never Used    Alcohol use 0.5 oz/week     1 Standard drinks or equivalent per week    Drug use: No    Sexual activity: No     Other Topics Concern    Not on file     Social History Narrative         ALLERGIES: Metformin and Vancomycin    Review of Systems   Constitutional: Positive for appetite change, chills and fatigue. HENT: Positive for trouble swallowing. Negative for drooling. Eyes: Positive for visual disturbance (blurred). Respiratory: Negative for cough and shortness of breath. Cardiovascular: Negative for chest pain and leg swelling. Gastrointestinal: Negative for abdominal pain. Endocrine: Negative for polyuria. Genitourinary: Positive for decreased urine volume. Musculoskeletal: Positive for gait problem. Skin: Negative for rash. Allergic/Immunologic: Positive for immunocompromised state (possibly). Neurological: Positive for syncope and light-headedness. Hematological: Does not bruise/bleed easily. Psychiatric/Behavioral: Positive for confusion. Vitals:    08/17/17 0400 08/17/17 0415 08/17/17 0430 08/17/17 0445   BP: 115/49 117/50 117/59 118/51   Pulse: 77 78 78 78   Resp: 19 20 21 19   Temp:       SpO2: 99% 99% 99% 99%   Weight:                Physical Exam   Constitutional: She is oriented to person, place, and time. She appears well-developed and well-nourished. She appears toxic. She has a sickly appearance. She appears ill. She appears distressed. HENT:   Head: Normocephalic and atraumatic. Right Ear: External ear normal.   Left Ear: External ear normal.   Nose: Nose normal.   Mouth/Throat: Uvula is midline and oropharynx is clear and moist. Mucous membranes are dry. No oral lesions. No trismus in the jaw. Eyes: Conjunctivae are normal. No scleral icterus. Neck: Neck supple. No tracheal deviation present. Cardiovascular: Normal rate, regular rhythm, normal heart sounds and intact distal pulses. Pulmonary/Chest: Effort normal and breath sounds normal. No stridor. Right upper chest picc in place with no swelling, drainage, erythema     Abdominal: Soft.  She exhibits no distension. There is no tenderness. Musculoskeletal: She exhibits no edema. Neurological: She is alert and oriented to person, place, and time. Gait normal.   Very slow to respond     Skin: Skin is warm and dry. She is not diaphoretic. Psychiatric: Her behavior is normal.   Nursing note and vitals reviewed.        [place]  ED Course       Procedures    Vitals:  Patient Vitals for the past 12 hrs:   Temp Pulse Resp BP SpO2   08/17/17 0445 - 78 19 118/51 99 %   08/17/17 0430 - 78 21 117/59 99 %   08/17/17 0415 - 78 20 117/50 99 %   08/17/17 0400 - 77 19 115/49 99 %   08/17/17 0345 - 75 19 107/45 99 %   08/17/17 0330 - 74 (!) 6 105/49 99 %   08/17/17 0300 - 75 22 110/47 98 %   08/17/17 0245 - 78 26 101/44 99 %   08/17/17 0230 - 79 25 (!) 96/27 100 %   08/17/17 0215 - 79 24 - 99 %   08/17/17 0200 97.6 °F (36.4 °C) 79 17 (!) 103/32 100 %         Medications ordered:   Medications   insulin regular (NOVOLIN R, HUMULIN R) 100 Units in 0.9% sodium chloride 100 mL infusion (15.1 Units/hr IntraVENous New Bag 8/17/17 0451)   glucose chewable tablet 16 g (not administered)   glucagon (GLUCAGEN) injection 1 mg (not administered)   dextrose (D50W) injection syrg 12.5-25 g (not administered)   linezolid (ZYVOX) IVPB premix in D5W 600 mg (600 mg IntraVENous Given 8/17/17 0530)   insulin regular (Novolin,Humulin) premix infusion (  Canceled Entry 8/17/17 0301)   NOREPINephrine (LEVOPHED) 8,000 mcg in dextrose 5% 250 mL infusion (not administered)   sodium chloride 0.9 % bolus infusion 2,000 mL (0 mL IntraVENous IV Completed 8/17/17 0500)   piperacillin-tazobactam (ZOSYN) 4.5 g in 0.9% sodium chloride (MBP/ADV) 100 mL MBP (0 g IntraVENous IV Completed 8/17/17 0354)   levoFLOXacin (LEVAQUIN) 750 mg in D5W IVPB (0 mg IntraVENous IV Completed 8/17/17 3951)   sodium chloride 0.9 % bolus infusion 1,000 mL (1,000 mL IntraVENous New Bag 8/17/17 7771)         Lab findings:  Recent Results (from the past 12 hour(s))   GLUCOSE, POC Collection Time: 08/17/17  2:02 AM   Result Value Ref Range    Glucose (POC) 598 (HH) 70 - 110 mg/dL   CBC WITH AUTOMATED DIFF    Collection Time: 08/17/17  2:18 AM   Result Value Ref Range    WBC 1.6 (L) 4.6 - 13.2 K/uL    RBC 3.73 (L) 4.20 - 5.30 M/uL    HGB 10.6 (L) 12.0 - 16.0 g/dL    HCT 30.1 (L) 35.0 - 45.0 %    MCV 80.7 74.0 - 97.0 FL    MCH 28.4 24.0 - 34.0 PG    MCHC 35.2 31.0 - 37.0 g/dL    RDW 16.6 (H) 11.6 - 14.5 %    PLATELET 363 793 - 679 K/uL    MPV 11.1 9.2 - 11.8 FL    NEUTROPHILS 3 (L) 40 - 73 %    LYMPHOCYTES 57 (H) 21 - 52 %    MONOCYTES 5 3 - 10 %    EOSINOPHILS 30 (H) 0 - 5 %    BASOPHILS 5 (H) 0 - 2 %    ABS. NEUTROPHILS 0.1 (L) 1.8 - 8.0 K/UL    ABS. LYMPHOCYTES 0.9 0.9 - 3.6 K/UL    ABS. MONOCYTES 0.1 0.05 - 1.2 K/UL    ABS. EOSINOPHILS 0.5 (H) 0.0 - 0.4 K/UL    ABS. BASOPHILS 0.1 (H) 0.0 - 0.06 K/UL    DF AUTOMATED     PROTHROMBIN TIME + INR    Collection Time: 08/17/17  2:18 AM   Result Value Ref Range    Prothrombin time 13.2 11.5 - 15.2 sec    INR 1.0 0.8 - 1.2     METABOLIC PANEL, COMPREHENSIVE    Collection Time: 08/17/17  2:18 AM   Result Value Ref Range    Sodium 126 (L) 136 - 145 mmol/L    Potassium 4.4 3.5 - 5.5 mmol/L    Chloride 92 (L) 100 - 108 mmol/L    CO2 25 21 - 32 mmol/L    Anion gap 9 3.0 - 18 mmol/L    Glucose 591 (HH) 74 - 99 mg/dL    BUN 55 (H) 7.0 - 18 MG/DL    Creatinine 3.79 (H) 0.6 - 1.3 MG/DL    BUN/Creatinine ratio 15 12 - 20      GFR est AA 16 (L) >60 ml/min/1.73m2    GFR est non-AA 13 (L) >60 ml/min/1.73m2    Calcium 7.4 (L) 8.5 - 10.1 MG/DL    Bilirubin, total 1.0 0.2 - 1.0 MG/DL    ALT (SGPT) 19 13 - 56 U/L    AST (SGOT) 5 (L) 15 - 37 U/L    Alk.  phosphatase 103 45 - 117 U/L    Protein, total 7.3 6.4 - 8.2 g/dL    Albumin 2.3 (L) 3.4 - 5.0 g/dL    Globulin 5.0 (H) 2.0 - 4.0 g/dL    A-G Ratio 0.5 (L) 0.8 - 1.7     MAGNESIUM    Collection Time: 08/17/17  2:18 AM   Result Value Ref Range    Magnesium 2.2 1.6 - 2.6 mg/dL   HCG QL SERUM    Collection Time: 08/17/17 2:18 AM   Result Value Ref Range    HCG, Ql. NEGATIVE  NEG     CARDIAC PANEL,(CK, CKMB & TROPONIN)    Collection Time: 08/17/17  2:18 AM   Result Value Ref Range    CK 32 26 - 192 U/L    CK - MB <1.0 <3.6 ng/ml    CK-MB Index  0.0 - 4.0 %     CALCULATION NOT PERFORMED WHEN RESULT IS BELOW LINEAR LIMIT    Troponin-I, Qt. 0.05 (H) 0.0 - 0.045 NG/ML   TSH 3RD GENERATION    Collection Time: 08/17/17  2:18 AM   Result Value Ref Range    TSH 1.32 0.36 - 3.74 uIU/mL   HEMOGLOBIN A1C WITH EAG    Collection Time: 08/17/17  2:18 AM   Result Value Ref Range    Hemoglobin A1c 9.8 (H) 4.2 - 5.6 %    Est. average glucose 235 mg/dL   POC VENOUS BLOOD GAS    Collection Time: 08/17/17  2:19 AM   Result Value Ref Range    Device: ROOM AIR      FIO2 (POC) 21 %    pH, venous (POC) 7.404 7.32 - 7.42      pCO2, venous (POC) 40.4 (L) 41 - 51 MMHG    pO2, venous (POC) 32 25 - 40 mmHg    HCO3, venous (POC) 25.4 23.0 - 28.0 MMOL/L    sO2, venous (POC) 63 (L) 65 - 88 %    Base excess, venous (POC) 1 mmol/L    Allens test (POC) N/A      Total resp.  rate 27      Site OTHER      Patient temp. 97.6      Specimen type (POC) VENOUS BLOOD      Performed by Maria M Martinez    [facility]    Collection Time: 08/17/17  2:20 AM   Result Value Ref Range    CO2, POC 23 19 - 24 MMOL/L    Glucose,  (HH) 74 - 106 MG/DL    BUN, POC 55 (H) 7 - 18 MG/DL    Creatinine, POC 3.6 (H) 0.6 - 1.3 MG/DL    GFRAA, POC 17 (L) >60 ml/min/1.73m2    GFRNA, POC 14 (L) >60 ml/min/1.73m2    Sodium,  (L) 136 - 145 MMOL/L    Potassium, POC 4.7 3.5 - 5.5 MMOL/L    Calcium, ionized (POC) 0.97 (L) 1.12 - 1.32 MMOL/L    Chloride, POC 92 (L) 100 - 108 MMOL/L    Anion gap, POC 18 10 - 20      Hematocrit, POC 30 (L) 36 - 49 %    Hemoglobin, POC 10.2 (L) 12 - 16 G/DL   POC LACTIC ACID    Collection Time: 08/17/17  2:27 AM   Result Value Ref Range    Lactic Acid (POC) 1.2 0.4 - 2.0 mmol/L   EKG, 12 LEAD, INITIAL    Collection Time: 08/17/17  2:43 AM   Result Value Ref Range Ventricular Rate 78 BPM    Atrial Rate 78 BPM    P-R Interval 182 ms    QRS Duration 82 ms    Q-T Interval 414 ms    QTC Calculation (Bezet) 471 ms    Calculated P Axis 32 degrees    Calculated R Axis 0 degrees    Calculated T Axis 95 degrees    Diagnosis       Normal sinus rhythm  Septal infarct , age undetermined  Abnormal ECG  No previous ECGs available     GLUCOSTABILIZER    Collection Time: 08/17/17  3:46 AM   Result Value Ref Range    Glucose 591 mg/dL    Insulin order 10.6 units/hour    Insulin adminstered 10.6 units/hour    Multiplier 0.020     Low target 140 mg/dL    High target 180 mg/dL    D50 order 0.0 ml    D50 administered 0.00 ml    Minutes until next BG 60 min    Order initials tcr     Administered initials tcr    URINALYSIS W/ RFLX MICROSCOPIC    Collection Time: 08/17/17  4:35 AM   Result Value Ref Range    Color YELLOW      Appearance CLEAR      Specific gravity 1.022 1.005 - 1.030      pH (UA) 6.0 5.0 - 8.0      Protein 300 (A) NEG mg/dL    Glucose >1000 (A) NEG mg/dL    Ketone NEGATIVE  NEG mg/dL    Bilirubin NEGATIVE  NEG      Blood NEGATIVE  NEG      Urobilinogen 0.2 0.2 - 1.0 EU/dL    Nitrites NEGATIVE  NEG      Leukocyte Esterase NEGATIVE  NEG     URINE MICROSCOPIC ONLY    Collection Time: 08/17/17  4:35 AM   Result Value Ref Range    WBC 0 to 3 0 - 4 /hpf    RBC 0 to 3 0 - 5 /hpf    Epithelial cells 1+ 0 - 5 /lpf    Bacteria 1+ (A) NEG /hpf    Amorphous Crystals 2+ (A) NEG   GLUCOSTABILIZER    Collection Time: 08/17/17  4:50 AM   Result Value Ref Range    Glucose 563 mg/dL    Insulin order 15.1 units/hour    Insulin adminstered 15.1 units/hour    Multiplier 0.030     Low target 140 mg/dL    High target 180 mg/dL    D50 order 0.0 ml    D50 administered 0.00 ml    Minutes until next BG 60 min    Order initials clp     Administered initials clp    GLUCOSE, POC    Collection Time: 08/17/17  4:52 AM   Result Value Ref Range    Glucose (POC) 565 (HH) 70 - 110 mg/dL   GLUCOSE, POC    Collection Time: 08/17/17  5:55 AM   Result Value Ref Range    Glucose (POC) 474 (HH) 70 - 110 mg/dL       EKG interpretation by ED Physician:    Cardiac monitor: normal rate with regular rhythm, no ectopy    nsr with no acute st tw changes  Rate 78, qtc 471  No previous    X-Ray, CT or other radiology findings or impressions:  XR CHEST PORT    (Results Pending)   CT NECK SOFT TISSUE WO CONT    (Results Pending)   picc in place. Nothing acute  Ct neck with bilateral prominent lymph nodes. No abscess    Progress notes, Consult notes or additional Procedure notes:   bp slightly improved after fluids. Still mildly hypotensive. This facility does not have abx pt currently taking thus additional abx ordered pending arrival from [facility]  Will need admission for glucose control, further treatment for infection  Pt had requested transfer to [facility] but there is no bed availability  Have informed pt need to stay here for further treatment. D/w Dr Rhianna Alfonso regarding pt and will admit    ED Critical Care Note    System at risk for life threatening failure: cardiac, renal, pulm,   Associated problems: hypotension, hyperglycemia, leukopenia    Critical Care services provided: bedside management, consult, documentation  Excluded procedures (time not included in critical care): ecg interp    Total Critical Care Time (in minutes) 58    Given diff likely with eosinophilic esophagitis; will need gi consult for possible egd  Current abx pt is on has noted side effect of leukopenia and after d/w dr Lisseth Hardin rec I cancel further dosing pending ID evaluation    Reevaluation of patient:   Stable for admission    Disposition:  Diagnosis:   1. MRSA bacteremia    2. Leukopenia, unspecified type    3. Hyperglycemia    4. Hypotension, unspecified hypotension type    5. HIV (human immunodeficiency virus infection) ([facility] [address]    6. Dysphagia, unspecified type    7.  Chronic renal insufficiency, unspecified stage        Disposition: admit    Follow-up Information     None            Patient's Medications   Start Taking    No medications on file   Continue Taking    AMITRIPTYLINE (ELAVIL) 100 MG TABLET    Take 100 mg by mouth nightly. ASPIRIN 81 MG CHEWABLE TABLET    Take 81 mg by mouth daily. ATORVASTATIN (LIPITOR) 80 MG TABLET    Take 80 mg by mouth daily. BLOOD-GLUCOSE METER MONITORING KIT    Any reliable brand -- test twice a day -- dx 250.00    CLOPIDOGREL (PLAVIX) 75 MG TABLET    Take 75 mg by mouth daily. EMTRICITABINE-TENOFOVIR (TRUVADA) 200-300 MG PER TABLET    Take  by mouth daily. GLUCOSE BLOOD VI TEST STRIPS (BLOOD GLUCOSE TEST) STRIP    Test twice a day -- dispense any reliable brand to work with patients glucometer    HYDROCODONE-ACETAMINOPHEN (NORCO) 5-325 MG PER TABLET    Take 1 Tab by mouth every eight (8) hours as needed for Pain. Max Daily Amount: 3 Tabs. INSULIN NEEDLES, DISPOSABLE, (BD INSULIN PEN NEEDLE UF ORIG) 29 GAUGE X 1/2 \" NDLE    Diagnosis 250.00 use once daily    LANTUS SOLOSTAR 100 UNIT/ML (3 ML) PEN    START 74 UNITS TWO TIMES A DAY . INCREASE BY 1 UNITS AM AND PM EVERY 3 DAYS UNTIL FBG IS CONSISTENTLY 150    LOSARTAN (COZAAR) 50 MG TABLET    Take 50 mg by mouth daily. METOPROLOL (LOPRESSOR) 50 MG TABLET    Take 100 mg by mouth two (2) times a day. NIFEDIPINE ER (NIFEDICAL XL) 30 MG ER TABLET    Take 30 mg by mouth daily. RALTEGRAVIR (ISENTRESS) 400 MG TABLET    Take 400 mg by mouth two (2) times a day. SITAGLIPTIN (JANUVIA) 100 MG TABLET    Take 1 Tab by mouth daily.    These Medications have changed    No medications on file   Stop Taking    No medications on file

## 2017-08-17 NOTE — PROGRESS NOTES
PT orders received and chart reviewed. Patient drowsy; supine in bed. Refusing OOB activity at this time d/t pain in right side of neck. Will follow up.      Thank you,     Jack Blount, PT, DPT

## 2017-08-17 NOTE — ROUTINE PROCESS
7859: Dr. Thi Young (Radiologist) called states there is some mild right sided tonsillitis noted in the CT from last night but no abscess. Bedside shift change report given to [employer], RN (oncoming nurse) by Daniel Kong RN (offgoing nurse). Report included the following information SBAR, Kardex, ED Summary, Intake/Output, MAR, Accordion, Recent Results, Med Rec Status and Cardiac Rhythm NSR.

## 2017-08-17 NOTE — DIABETES MGMT
NUTRITIONAL ASSESSMENT [facility]     Lucretia Quezada           43 y.o.           8/17/2017                 1. MRSA bacteremia    2. Leukopenia, unspecified type    3. Hyperglycemia    4. Hypotension, unspecified hypotension type    5. HIV (human immunodeficiency virus infection) ([address]    6. Dysphagia, unspecified type    7. Chronic renal insufficiency, unspecified stage       INTERVENTIONS/PLAN:   1. Suggest continuing insulin drip until blood glucose is in target range (140- 180 mg/dL) for at least 4 hours and there is no increase > 0.02 in the multiplier for at least 4 hours. 2.  When ready to transition off insulin drip, suggest starting with 21 units Levemir q 12 hours (high dose of 0.2 units Levemir/kg q 12 hours per protocol) and continuing drip for 2 hours after first dose of basal insulin. Also suggest corrective lispro 4 times daily. 3.  Monitor glycemic control, labs, weights and feeding status. ASSESSMENT:   Nutritional Status:  Pt is 186% ideal wt; BMI (calculated): 38.9 kg/m2 (obese wt classification). Pt appears well nourished but is at nutrition risk due to inability to swallow. Currently NPO. Diabetes Management:   Pt states she takes her insulin daily but did not take it yesterday due to not being able to eat. Pt states she has a meter at home and her recent BG has been in the 200's. Blood glucose trending down on GlucoStabilizer insulin drip. Pt appear to have high meal time insulin requirements at home. Recent blood glucose:   8/17/17: 598, 591, 565, 474, 437, 356, 266, 244, 206    Within target range (non-ICU: <140; ICU<180): [] Yes   [x]  No, but progressing towards targets    Current Insulin regimen:   GlucoStabilizer insulin drip now infusing at 4.4 units/hr for last BG of 206 mg/dL    Home medication/insulin regimen:   Lantus (solostar pen) - 65 units/day  Humalog TID - pt does not know the dose without looking at her \"chart\".     Per Chart Everywhere chart review, pt was discharged from [facility] on 8/2/17 with instructions to take Lantus, 69 units/d and Humalog 42 units at breakfast, 17 units at lunch and 25 units at supper. HbA1c:  9.8% - ave BG has been ~ 234 mg/dL o alonso past 3 months. Adequate glycemic control PTA:  [] Yes  [x] No       SUBJECTIVE/OBJECTIVE:   Information obtained from: chart review, ICU rounds  Pt states she has likely lost weight from not being able to swallow but is unable to quantify. Pt denies having food allergies. Pt presented to ED with symptoms of neck pain and dysphagia and admitted with dehydration, hyponatremia, leukopenia, hyperglycemia, elevated troponin, SAVI, MRSA and PMhx of T2DM, HTN, HIV, CAD. Pt was recently hospitalized at [facility] with abscess on buttocks/MRSA and discharged with a PICC   . Diet: NPO    No data found.         Medications: [x]                Reviewed     Most Recent POC Glucose:   Recent Labs      08/17/17   0709  08/17/17   0218   GLU  385*  591*         Labs:   Lab Results   Component Value Date/Time    Hemoglobin A1c 9.8 08/17/2017 02:18 AM     Lab Results   Component Value Date/Time    Hemoglobin A1c 9.8 08/17/2017 02:18 AM    Hemoglobin A1c 11.3 12/01/2015 10:55 AM    Hemoglobin A1c 8.4 09/25/2015 01:46 PM     Lab Results   Component Value Date/Time    Sodium 135 08/17/2017 07:09 AM    Potassium 4.2 08/17/2017 07:09 AM    Chloride 104 08/17/2017 07:09 AM    CO2 21 08/17/2017 07:09 AM    Anion gap 10 08/17/2017 07:09 AM    Glucose 385 08/17/2017 07:09 AM    BUN 51 08/17/2017 07:09 AM    Creatinine 3.21 08/17/2017 07:09 AM    Calcium 6.9 08/17/2017 07:09 AM    Magnesium 2.2 08/17/2017 02:18 AM    Albumin 2.3 08/17/2017 02:18 AM       Anthropometrics: IBW : 56.8 kg (125 lb 3.5 oz), % IBW (Calculated): 186.44 %, BMI (calculated): 38.9  Wt Readings from Last 1 Encounters:   08/17/17 105.9 kg (233 lb 7.5 oz)      Ht Readings from Last 1 Encounters:   08/17/17 5' 5\" (1.651 m) Estimated Nutrition Needs:  9106 Kcals/day, Protein (g): 68 g Fluid (ml): 1600 ml  Based on:   [x]          Actual BW    []          ABW   []            Adjusted BW        Nutrition Diagnoses:   Obesity due to excess energy intake as evidenced by BMI of 38.9 kg/m2. Altered nutrition related labs due to diabetes as evidenced by A1C of 9.8%. Nutrition Interventions:  None at this time  Goal:   Blood glucose will be within target range of  mg/dL by 8/20/17. Wt maintenance (+/- 1-2 kg) or slow, gradual wt loss (1-2#) by 8/27/17. Po intake will meet >75% meals by 8/22/17.         Nutrition Monitoring and Evaluation      [x]     Monitor po intake on meal rounds  [x]     Continue inpatient monitoring and intervention  []     Other:      Nutrition Risk:  []   High     []  Moderate    [x]  Minimal/Uncompromised    Guerda Gonzalez RD, CDE   Office:  [address] [place] [address] Pager:  [phone]

## 2017-08-17 NOTE — PROGRESS NOTES
Pt is under Contact Precaution and with family. Spoke with Mother when she stepped outside room. Introduced [facility] and left literature. Unable to assess needs.   RHTIFFANY

## 2017-08-17 NOTE — PROGRESS NOTES
Problem: Falls - Risk of  Goal: *Absence of Falls  Document Don Fall Risk and appropriate interventions in the flowsheet.    Outcome: Progressing Towards Goal  Fall Risk Interventions:                    Elimination Interventions: Call light in reach, Patient to call for help with toileting needs

## 2017-08-18 LAB
ANION GAP SERPL CALC-SCNC: 10 MMOL/L (ref 3–18)
ATRIAL RATE: 78 BPM
BASOPHILS # BLD: 0 K/UL (ref 0–0.06)
BASOPHILS NFR BLD: 2 % (ref 0–2)
BUN SERPL-MCNC: 29 MG/DL (ref 7–18)
BUN/CREAT SERPL: 12 (ref 12–20)
CALCIUM SERPL-MCNC: 6.5 MG/DL (ref 8.5–10.1)
CALCULATED P AXIS, ECG09: 32 DEGREES
CALCULATED R AXIS, ECG10: 0 DEGREES
CALCULATED T AXIS, ECG11: 95 DEGREES
CHLORIDE SERPL-SCNC: 109 MMOL/L (ref 100–108)
CMV IGG SERPL IA-ACNC: >10 U/ML (ref 0–0.59)
CMV IGM SERPL IA-ACNC: <30 AU/ML (ref 0–29.9)
CO2 SERPL-SCNC: 20 MMOL/L (ref 21–32)
CREAT SERPL-MCNC: 2.33 MG/DL (ref 0.6–1.3)
DIAGNOSIS, 93000: NORMAL
DIFFERENTIAL METHOD BLD: ABNORMAL
EBV EA IGG SER-ACNC: >150 U/ML (ref 0–8.9)
EBV NA IGG SER-ACNC: >600 U/ML (ref 0–17.9)
EBV VCA IGG SER-ACNC: >600 U/ML (ref 0–17.9)
EBV VCA IGM SER-ACNC: <36 U/ML (ref 0–35.9)
EOSINOPHIL # BLD: 0.4 K/UL (ref 0–0.4)
EOSINOPHIL NFR BLD: 17 % (ref 0–5)
ERYTHROCYTE [DISTWIDTH] IN BLOOD BY AUTOMATED COUNT: 16.5 % (ref 11.6–14.5)
GLUCOSE BLD STRIP.AUTO-MCNC: 115 MG/DL (ref 70–110)
GLUCOSE BLD STRIP.AUTO-MCNC: 122 MG/DL (ref 70–110)
GLUCOSE BLD STRIP.AUTO-MCNC: 137 MG/DL (ref 70–110)
GLUCOSE BLD STRIP.AUTO-MCNC: 69 MG/DL (ref 70–110)
GLUCOSE BLD STRIP.AUTO-MCNC: 82 MG/DL (ref 70–110)
GLUCOSE BLD STRIP.AUTO-MCNC: 99 MG/DL (ref 70–110)
GLUCOSE SERPL-MCNC: 66 MG/DL (ref 74–99)
HCT VFR BLD AUTO: 28.5 % (ref 35–45)
HGB BLD-MCNC: 9.6 G/DL (ref 12–16)
INR PPP: 1.1 (ref 0.8–1.2)
INTERPRETATION, 169995: ABNORMAL
LYMPHOCYTES # BLD: 1.5 K/UL (ref 0.9–3.6)
LYMPHOCYTES NFR BLD: 66 % (ref 21–52)
MCH RBC QN AUTO: 27.7 PG (ref 24–34)
MCHC RBC AUTO-ENTMCNC: 33.7 G/DL (ref 31–37)
MCV RBC AUTO: 82.4 FL (ref 74–97)
MONOCYTES # BLD: 0.3 K/UL (ref 0.05–1.2)
MONOCYTES NFR BLD: 15 % (ref 3–10)
NEUTS SEG # BLD: 0 K/UL (ref 1.8–8)
NEUTS SEG NFR BLD: 0 % (ref 40–73)
P-R INTERVAL, ECG05: 182 MS
PLATELET # BLD AUTO: 268 K/UL (ref 135–420)
PMV BLD AUTO: 10.9 FL (ref 9.2–11.8)
POTASSIUM SERPL-SCNC: 4.1 MMOL/L (ref 3.5–5.5)
PROTHROMBIN TIME: 14.1 SEC (ref 11.5–15.2)
Q-T INTERVAL, ECG07: 414 MS
QRS DURATION, ECG06: 82 MS
QTC CALCULATION (BEZET), ECG08: 471 MS
RBC # BLD AUTO: 3.46 M/UL (ref 4.2–5.3)
SODIUM SERPL-SCNC: 139 MMOL/L (ref 136–145)
VENTRICULAR RATE, ECG03: 78 BPM
WBC # BLD AUTO: 2.3 K/UL (ref 4.6–13.2)

## 2017-08-18 PROCEDURE — 74011000250 HC RX REV CODE- 250: Performed by: INTERNAL MEDICINE

## 2017-08-18 PROCEDURE — 77030020847 HC STATLOK BARD -A

## 2017-08-18 PROCEDURE — 74011250636 HC RX REV CODE- 250/636: Performed by: INTERNAL MEDICINE

## 2017-08-18 PROCEDURE — 74011250636 HC RX REV CODE- 250/636: Performed by: EMERGENCY MEDICINE

## 2017-08-18 PROCEDURE — 77030011256 HC DRSG MEPILEX <16IN NO BORD MOLN -A

## 2017-08-18 PROCEDURE — 74011000250 HC RX REV CODE- 250: Performed by: EMERGENCY MEDICINE

## 2017-08-18 PROCEDURE — 74011250636 HC RX REV CODE- 250/636: Performed by: FAMILY MEDICINE

## 2017-08-18 PROCEDURE — 85610 PROTHROMBIN TIME: CPT | Performed by: FAMILY MEDICINE

## 2017-08-18 PROCEDURE — 74011000258 HC RX REV CODE- 258: Performed by: INTERNAL MEDICINE

## 2017-08-18 PROCEDURE — 82962 GLUCOSE BLOOD TEST: CPT

## 2017-08-18 PROCEDURE — 74011250637 HC RX REV CODE- 250/637: Performed by: FAMILY MEDICINE

## 2017-08-18 PROCEDURE — 85025 COMPLETE CBC W/AUTO DIFF WBC: CPT | Performed by: FAMILY MEDICINE

## 2017-08-18 PROCEDURE — 80048 BASIC METABOLIC PNL TOTAL CA: CPT | Performed by: FAMILY MEDICINE

## 2017-08-18 PROCEDURE — 65660000000 HC RM CCU STEPDOWN

## 2017-08-18 RX ORDER — MORPHINE SULFATE 2 MG/ML
1-4 INJECTION, SOLUTION INTRAMUSCULAR; INTRAVENOUS
Status: DISCONTINUED | OUTPATIENT
Start: 2017-08-18 | End: 2017-08-22

## 2017-08-18 RX ORDER — NIFEDIPINE 30 MG/1
30 TABLET, EXTENDED RELEASE ORAL DAILY
Status: DISCONTINUED | OUTPATIENT
Start: 2017-08-18 | End: 2017-08-19 | Stop reason: SDUPTHER

## 2017-08-18 RX ORDER — LOSARTAN POTASSIUM 50 MG/1
50 TABLET ORAL DAILY
Status: DISCONTINUED | OUTPATIENT
Start: 2017-08-18 | End: 2017-08-19 | Stop reason: SDUPTHER

## 2017-08-18 RX ORDER — METOPROLOL TARTRATE 50 MG/1
100 TABLET ORAL 2 TIMES DAILY
Status: DISCONTINUED | OUTPATIENT
Start: 2017-08-18 | End: 2017-08-19 | Stop reason: SDUPTHER

## 2017-08-18 RX ORDER — INSULIN LISPRO 100 [IU]/ML
INJECTION, SOLUTION INTRAVENOUS; SUBCUTANEOUS
Status: DISCONTINUED | OUTPATIENT
Start: 2017-08-18 | End: 2017-08-22 | Stop reason: HOSPADM

## 2017-08-18 RX ORDER — LIDOCAINE HYDROCHLORIDE 20 MG/ML
5 SOLUTION OROPHARYNGEAL
Status: DISCONTINUED | OUTPATIENT
Start: 2017-08-18 | End: 2017-08-22 | Stop reason: HOSPADM

## 2017-08-18 RX ADMIN — LINEZOLID 600 MG: 600 INJECTION, SOLUTION INTRAVENOUS at 03:48

## 2017-08-18 RX ADMIN — HEPARIN SODIUM 5000 UNITS: 5000 INJECTION, SOLUTION INTRAVENOUS; SUBCUTANEOUS at 18:00

## 2017-08-18 RX ADMIN — NIFEDIPINE 30 MG: 30 TABLET, FILM COATED, EXTENDED RELEASE ORAL at 23:17

## 2017-08-18 RX ADMIN — HEPARIN SODIUM 5000 UNITS: 5000 INJECTION, SOLUTION INTRAVENOUS; SUBCUTANEOUS at 23:21

## 2017-08-18 RX ADMIN — LOSARTAN POTASSIUM 50 MG: 50 TABLET ORAL at 23:17

## 2017-08-18 RX ADMIN — CLINDAMYCIN PHOSPHATE 600 MG: 150 INJECTION, SOLUTION, CONCENTRATE INTRAVENOUS at 16:24

## 2017-08-18 RX ADMIN — MORPHINE SULFATE 3 MG: 2 INJECTION, SOLUTION INTRAMUSCULAR; INTRAVENOUS at 13:36

## 2017-08-18 RX ADMIN — HEPARIN SODIUM 5000 UNITS: 5000 INJECTION, SOLUTION INTRAVENOUS; SUBCUTANEOUS at 09:52

## 2017-08-18 RX ADMIN — ACETAMINOPHEN 650 MG: 650 SOLUTION ORAL at 03:58

## 2017-08-18 RX ADMIN — ACETAMINOPHEN 650 MG: 650 SUPPOSITORY RECTAL at 10:10

## 2017-08-18 RX ADMIN — MORPHINE SULFATE 2 MG: 2 INJECTION, SOLUTION INTRAMUSCULAR; INTRAVENOUS at 04:01

## 2017-08-18 RX ADMIN — DEXTROSE MONOHYDRATE 12.5 G: 25 INJECTION, SOLUTION INTRAVENOUS at 00:33

## 2017-08-18 RX ADMIN — MORPHINE SULFATE 2 MG: 2 INJECTION, SOLUTION INTRAMUSCULAR; INTRAVENOUS at 09:52

## 2017-08-18 RX ADMIN — LIDOCAINE HYDROCHLORIDE 5 ML: 20 SOLUTION ORAL; TOPICAL at 11:47

## 2017-08-18 RX ADMIN — CLINDAMYCIN PHOSPHATE 600 MG: 150 INJECTION, SOLUTION, CONCENTRATE INTRAVENOUS at 23:22

## 2017-08-18 RX ADMIN — METOPROLOL TARTRATE 100 MG: 50 TABLET ORAL at 23:17

## 2017-08-18 RX ADMIN — MORPHINE SULFATE 4 MG: 2 INJECTION, SOLUTION INTRAMUSCULAR; INTRAVENOUS at 23:37

## 2017-08-18 RX ADMIN — ACETAMINOPHEN 650 MG: 650 SUPPOSITORY RECTAL at 16:24

## 2017-08-18 NOTE — PROGRESS NOTES
ICU AM  (Multidisciplinary/ Interdisciplinary) Rounds   8/18/2017  Observed patient & discussed case w/ BSRN. Main dx/ reason for SDU admission: neck pain, possible tonsillitits, DM, HIV, Leukopenia    101.6; c/o right neck/throat pain; room air; BP high    Management by Hospitalist & ID   * On abx. * VTEP: yes    INTERVENTIONS:  Increased morphine prn dose  DC IVF (was on NS @ 150)  viscous lidocaine    PLAN:  PCCM continue to be available as needed.    Will continue to follow on MDR while she remains in SDU  Please call for consult, questions or concerns.     -suzy   pager: [phone]

## 2017-08-18 NOTE — PROGRESS NOTES
1006 patient sitting on side of bed eating clear liquid breakfast     1010 tylenol suppository given for temp 101.5 oral    1048 Patient B/P 175/103 after receiving pain medication Rosarua ARITA made aware during a.m. Rounds. NS infusion discontinued will continue to monitor. Patient states her pain is not relived with the current dose of IV morphine, Glucose 66 in this mornings BMP, and shivering with oral temp 101.5F. Jessica Collins MD made aware lidocaine swish and swallow ordered. 1121 No BM since 8/15 per patient. Patient does not want a stool softener at this time as she has not been eating much. Cosmo ARITA made aware. Plan to transfer patient to telemetry floor today       [phone] [id number] Nelson removed. Son at at bedside     1341 Attempted to call report to [facility] to transfer patient out    1456 TRANSFER to [facility]    Verbal report given to Gareth Stover RN(3S) from [facility] (ICU) on 8digits  being transferred to [facility] room 3020(unit) for routine progression of care       Report consisted of patients Situation, Background, Assessment and   Recommendations(SBAR). Information from the following report(s) SBAR, Kardex, ED Summary, Intake/Output, MAR, Recent Results and Cardiac Rhythm ST was reviewed with the receiving nurse. Lines:   PICC Double Lumen Right (Active)   Central Line Being Utilized Yes 8/18/2017 12:00 PM   Criteria for Appropriate Use Long term IV/antibiotic administration 8/18/2017 12:00 PM   Site Assessment Clean, dry, & intact 8/18/2017 12:00 PM   Phlebitis Assessment 0 8/18/2017 12:00 PM   Infiltration Assessment 0 8/18/2017 12:00 PM   Date of Last Dressing Change 08/18/17 8/18/2017  8:00 AM   Dressing Status Clean, dry, & intact 8/18/2017 12:00 PM   Action Taken Open ports on tubing capped 8/18/2017  4:00 AM   Dressing Type Disk with Chlorhexadine gluconate (CHG); Transparent 8/18/2017 12:00 PM   Hub Color/Line Status Red;Capped;Flushed;Patent 8/18/2017 12:00 PM   Positive Blood Return (Site #1) Yes 8/18/2017 12:00 PM   Hub Color/Line Status Purple;Capped;Flushed;Patent 8/18/2017 12:00 PM   Positive Blood Return (Site #2) Yes 8/18/2017 12:00 PM   Alcohol Cap Used Yes 8/18/2017 12:00 PM       Peripheral IV 08/17/17 Left Other(comment) (Active)   Site Assessment Clean, dry, & intact 8/18/2017 12:00 PM   Phlebitis Assessment 0 8/18/2017 12:00 PM   Infiltration Assessment 0 8/18/2017 12:00 PM   Dressing Status Clean, dry, & intact 8/18/2017 12:00 PM   Dressing Type Transparent 8/18/2017  8:00 AM   Hub Color/Line Status Blue;Flushed;Patent;Capped 8/18/2017  8:00 AM   Action Taken Open ports on tubing capped 8/18/2017  4:00 AM   Alcohol Cap Used Yes 8/18/2017  8:00 AM        Opportunity for questions and clarification was provided.       Patient transported with:   Monitor  Registered Nurse

## 2017-08-18 NOTE — PROGRESS NOTES
1026: 2nd PT attempt. Patient returning to bed from sitting EOB with nursing for dressing change. Declining further mobility this time d/t fatigue from mobility with nursing. O2 saturation decreased to 86% with mobility with nursing at present time. Will follow up.   2955: 1st PT attempt. Patient sleeping in bed. Arouses to voices; eyes remain close. Declines OOB activity. Reporting 10/10 right neck pain. Educated on importance of mobility. Will follow up.      Thank you,     Alice Strange, PT, DPT

## 2017-08-18 NOTE — PROGRESS NOTES
Infectious Disease Follow-up     Admit Date: 8/17/2017     Will plan to check back on Monday 8/21/2017. Dr. Yelena Mckinnon is on call for ID this weekend and can be reached at [phone] if needed. Current abx Prior abx    Zyvox 8/17 - 1  Clindamycin 8/18 - 0 Teflaro x 4 weeks since 7/19, levoflox/ zosyn 8/17 x 1      ASSESSMENT: -- RECOMMENDATIONS       Rt sided tonsillitis without abscess   - Infectious Mononucleosis (EBV reactivation by serology but denies prior episode) - no GAStrep,   - on exam rt swollen tonsils with white patches on it, cervical LN +, +odynophagia. No thrush. - CT neck with tonsillitis, no abscess   - Strep screen No GAS,   - feeling worse on Zyvox  - EBV VCAS IgM neg, EA IgG +, VCA IgG +, NA IgG+ (Reactivation >> Past Infection) but Mono screen negative -> dc Linezolid  -> add back Clindamycin (empiric coverage until better)  -> symptomatic rx per IM   Leukopenia with eosinophilia  - known chronic leukocytosis now with neutropenia /leukopenia due EBV infection  - also had 30% eosinophilia ?  Drug reaction to Teflaro  - monitor for now   Recent MRSA BSI from rt buttocks abscess at [facility]  - admitted 7/16-8/2, blcx 7/16 2/2 positive, repeat 7/19 neg   - s/p I and D   - PICC 7/24 placed, sent out on Teflaro for total 4 weeks  - monitor Blcx here   - should d/c PICC before discharge    HIV since 1999  - Follows [facility]   - last CD4 607/ 19.6%, VL 5660 [ 6/28/17 ]  - ART - Tenofovir 25mg daily, emtricitabine 200mg q 72hr, raltegravir 400mg bid   - h/o non compliance, currently also non compliant , says did not get prescription of tenofovir, emtricitabine since discharge [ issue with pharmacy ], hence not taking any ART since discharge. -> hold ART since not taking anyway, will ask to resume when she has all meds at home  - f/up [facility] ID after discharge [ has appointment on 8/22 ]   CKD stage 3  - baseline cr around 3      DM type 2 uncontrolled - hba1c 9.8%     CAD s/p CABG x 4 in 2015     Comorbid :- HTN, TIA, HLP        MICROBIOLOGY:   [ [facility]  Blcx - 2/2 MRSA,  Blcx x 2 NTD, TTE neg ]        Blcx x 2 - ip            Ucx - ip      LINES/DRAINS:   Rt chest PICC  at [facility] 21 Problems    Diagnosis Date Noted    Dehydration 2017    Hyponatremia 2017    Leukopenia 2017    Anemia 2017    Hyperglycemia 2017    Dysphagia 2017    Elevated troponin 2017    SAVI (acute kidney [facility]) ([facility] 2017    MRSA (methicillin resistant staph aureus) culture positive 2017    Neck pain on right side 2017    Eosinophilia 2017    HIV (human immunodeficiency virus infection) ([facility] 2013         Subjective: Interval notes reviewed. Says right throat pain is worse today. EBV serology suggests re-activation but she denies prior episode. Current Facility-Administered Medications   Medication Dose Route Frequency    morphine injection 1-4 mg  1-4 mg IntraVENous Q2H PRN    lidocaine (XYLOCAINE) 2 % viscous solution 5 mL  5 mL Mouth/Throat TID PRN    glucose chewable tablet 16 g  4 Tab Oral PRN    glucagon (GLUCAGEN) injection 1 mg  1 mg IntraMUSCular PRN    dextrose (D50W) injection syrg 12.5-25 g  25-50 mL IntraVENous PRN    linezolid (ZYVOX) IVPB premix in D5W 600 mg  600 mg IntraVENous Q12H    heparin (porcine) injection 5,000 Units  5,000 Units SubCUTAneous Q8H    insulin lispro (HUMALOG) injection   SubCUTAneous Q6H    acetaminophen (TYLENOL) solution 650 mg  650 mg Oral Q4H PRN    acetaminophen (TYLENOL) suppository 650 mg  650 mg Rectal Q4H PRN         Objective:     Visit Vitals    BP (!) 185/91    Pulse (!) 104    Temp 99.9 °F (37.7 °C)    Resp 25    Ht 5' 5\" (1.651 m)    Wt 107.4 kg (236 lb 12.4 oz)    SpO2 100%    BMI 39.4 kg/m2       Temp (24hrs), Av.4 °F (38 °C), Min:99 °F (37.2 °C), Max:101.6 °F (38.7 °C)      General: Well developed, obese 43 y.o.  [place]inamese female in distress from throat pain  ENT: ENT exam normal, no neck nodes or sinus tenderness  Head: normocephalic, without obvious abnormality  Mouth:  could not visualize tonsils today - large tongue  Neck: rt sided jugular lymphadenopathy   Cardio:  regular rate and rhythm, S1, S2 normal, no murmur  Chest: mild line scar from previous CABG, rt chest PICC intact - has come out 1-2 cm but no drainage or erythema  Lungs: clear to auscultation, no wheezes or rales and unlabored breathing  Abdomen: soft, non-tender. Bowel sounds normal. No masses, no organomegaly. Extremities:  no redness or tenderness in the calves or thighs, no edema  Neuro: Grossly normal      Labs: Results:   Chemistry Recent Labs      08/18/17   0350  08/17/17   2036  08/17/17   1554   08/17/17   0218   GLU  66*  71*  283*   < >  591*   NA  139  140  134*   < >  126*   K  4.1  4.2  3.4*   < >  4.4   CL  109*  110*  103   < >  92*   CO2  20*  21  20*   < >  25   BUN  29*  33*  36*   < >  55*   CREA  2.33*  2.43*  2.38*   < >  3.79*   CA  6.5*  7.3*  6.3*   < >  7.4*   AGAP  10  9  11   < >  9   BUCR  12  14  15   < >  15   AP   --    --    --    --   103   TP   --    --    --    --   7.3   ALB   --    --    --    --   2.3*   GLOB   --    --    --    --   5.0*   AGRAT   --    --    --    --   0.5*    < > = values in this interval not displayed.       CBC w/Diff Recent Labs      08/18/17   0350  08/17/17   0709  08/17/17   0218   WBC  2.3*  1.3*  1.6*   RBC  3.46*  3.38*  3.73*   HGB  9.6*  9.4*  10.6*   HCT  28.5*  27.5*  30.1*   PLT  268  245  291   GRANS  0*  2*  3*   LYMPH  66*  80*  57*   EOS  17*  8*  30*        8/18/2017 12:38 PM - Eugenio, [facility] [employer] [facility]       Component Results      Component Value Flag Ref Range Units Status     EBV Ab VCA, IgM <36.0  0.0 - 35.9 U/mL Final     Comment:     (NOTE)                                   Negative        <36.0                                   Equivocal 36.0 - 43.9                                   Positive        >43.9        EBV Early Antigen Ab, IgG >150.0 (H) 0.0 - 8.9 U/mL Final     Comment:     (NOTE)                                   Negative        < 9.0                                   Equivocal  9.0 - 10.9                                   Positive        >10.9        EBV Ab VCA, IgG >600.0 (H) 0.0 - 17.9 U/mL Final     Comment:     (NOTE)                                   Negative        <18.0                                   Equivocal 18.0 - 21. 9                                   Positive        >21.9        EBV Nuclear Antigen Ab, IgG >600.0 (H) 0.0 - 17.9 U/mL Final     Comment:     (NOTE)                                   Negative        <18.0                                   Equivocal 18.0 - 21. 9                                   Positive        >21.9          Microbiology Results  Recent Labs      08/17/17   1554  08/17/17   0435  08/17/17   0218   CULT  NO BETA HEMOLYTIC STREPTOCOCCUS GROUP A ISOLATED TO DATE  NO GROWTH AFTER 25 HOURS  NO GROWTH 1 DAY       Juan Luis Moy MD  August 18, 2017  [facility] AT THE [facility]  [phone]

## 2017-08-18 NOTE — ROUTINE PROCESS
Report received from [facility] [facility] [address] - patient transferred to [id number] via BRAULIO Keita 23. Son and mother at bedside. Patient continues on neutropenic precautions and contact for MRSA. Alert and oriented - oriented to call system. 1630 - Temp noted - tylenol given per orders. Family continues at bedside.

## 2017-08-18 NOTE — PROGRESS NOTES
Physical Exam   Skin:             Primary Nurse Vicente Nunez RN and Angelica RN performed a dual skin assessment on this patient Impairment noted- see wound doc flow sheet  Jaswant score is 19

## 2017-08-18 NOTE — ROUTINE PROCESS
Bedside and Verbal shift change report given to Kathy Martinez RN (oncoming nurse) by Stephanie Dominguez RN (offgoing nurse). Report included the following information SBAR, Kardex and MAR.

## 2017-08-18 NOTE — MED STUDENT NOTES
Progress Note    Patient: Ganesh Chong MRN: [id number]  SSN: [id number]    YOB: 1975  Age: 43 y.o. Sex: female      Admit Date: 8/17/2017    LOS: 1 day     Subjective:     Patient with history of HIV, HTN and Diabetes admitted to the hospital for tonsillitis with underlying EBV infection and leukopenia. Patient complains of extreme dysphagia and fever/chills. Objective:     Vitals:    08/18/17 1200 08/18/17 1300 08/18/17 1400 08/18/17 1454   BP: 153/77 (!) 185/91 179/87 (!) 162/101   Pulse: (!) 107 (!) 104 99    Resp: 26 25 28    Temp: (!) 100.5 °F (38.1 °C) 99.9 °F (37.7 °C)     SpO2: 100% 100% 98%    Weight:       Height:            Intake and Output:  Current Shift: 08/18 0701 - 08/18 1900  In: 077 [P.O.:120; I.V.:715]  Out: 800 [Urine:800]  Last three shifts: 08/16 1901 - 08/18 0700  In: 7022.2 [P.O.:720.3; I.V.:6301.9]  Out: 1934 [Urine:4280]    Physical Exam:   General: distressed and in pain due to dysphagia  HEENT and Neck: normocephalic, atraumatic, right sided lymphadenopathy  Mouth:  could not visualize well, no signs of oral leukoplakia  Cardio:  RRR, ce S1/S2. No M/R/G  Lungs: CTAB, no wheezes or rales. Normal effort  Abdomen: soft, non-tender. Normoactive bowel sounds. No masses, no organomegaly. Extremities:  No edema.  No cyanosis    Lab/Data Review:  CMP:   Lab Results   Component Value Date/Time     08/18/2017 03:50 AM    K 4.1 08/18/2017 03:50 AM     (H) 08/18/2017 03:50 AM    CO2 20 (L) 08/18/2017 03:50 AM    AGAP 10 08/18/2017 03:50 AM    GLU 66 (L) 08/18/2017 03:50 AM    BUN 29 (H) 08/18/2017 03:50 AM    CREA 2.33 (H) 08/18/2017 03:50 AM    GFRAA 28 (L) 08/18/2017 03:50 AM    GFRNA 23 (L) 08/18/2017 03:50 AM    CA 6.5 (L) 08/18/2017 03:50 AM     CBC:   Lab Results   Component Value Date/Time    WBC 2.3 (L) 08/18/2017 03:50 AM    HGB 9.6 (L) 08/18/2017 03:50 AM    HCT 28.5 (L) 08/18/2017 03:50 AM     08/18/2017 03:50 AM     All Cardiac Markers in the last 24 hours:   Lab Results   Component Value Date/Time    TROIQ 0.03 08/17/2017 08:36 PM    TROIQ 0.04 08/17/2017 03:54 PM     Recent Glucose Results:   Lab Results   Component Value Date/Time    GLU 66 (L) 08/18/2017 03:50 AM    GLU 71 (L) 08/17/2017 08:36 PM     (H) 08/17/2017 03:54 PM     COAGS:   Lab Results   Component Value Date/Time    PTP 14.1 08/18/2017 03:50 AM    INR 1.1 08/18/2017 03:50 AM       Assessment:     Principal Problem:    Neck pain on right side (8/17/2017)    Active Problems:    HIV (human immunodeficiency virus infection) ([facility] (4/30/2013)      Dehydration (8/17/2017)      Hyponatremia (8/17/2017)      Leukopenia (8/17/2017)      Anemia (8/17/2017)      Hyperglycemia (8/17/2017)      Dysphagia (8/17/2017)      Elevated troponin (8/17/2017)      SAVI (acute kidney injury) ([facility] (8/17/2017)      MRSA (methicillin resistant staph aureus) culture positive (8/17/2017)      Eosinophilia (8/17/2017)        Plan:     Right sided tonsillitis - d/c linezolid, add clindamycin, pain meds   Leukopenia - follow  HIV - hold anti-retroviral therapy until improvement in dysphagia  Hypercholesterolemia - restart statin  HTN - restart clopidogrel, metoprolol and nifedipine    Signed By: Leann Rock     August 18, 2017          *ATTENTION:  This note has been created by a medical student for educational purposes only. Please do not refer to the content of this note for clinical decision-making, billing, or other purposes. Please see attending physicians note to obtain clinical information on this patient. *

## 2017-08-18 NOTE — PROGRESS NOTES
Problem: Falls - Risk of  Goal: *Absence of Falls  Document Don Fall Risk and appropriate interventions in the flowsheet.    Outcome: Progressing Towards Goal  Fall Risk Interventions:  Mobility Interventions: Bed/chair exit alarm           Medication Interventions: Bed/chair exit alarm, Patient to call before getting OOB     Elimination Interventions: Bed/chair exit alarm

## 2017-08-18 NOTE — PROGRESS NOTES
2 [facility]  Hospitalist Division        Inpatient Daily Progress Note    Daily progress Note    Patient: Tiffanie Munoz MRN: [id number]  CSN: [id number]    YOB: 1975  Age: 43 y.o. Sex: female    DOA: 8/17/2017 LOS:  LOS: 1 day                    Chief Complaint:  Neck pain       Subjective:      C/o sore throat, fever. Denies chills, n/v/d, chest pain or shortness of breath. In no distress. Objective:      Visit Vitals    /86    Pulse (!) 110    Temp (!) 102.9 °F (39.4 °C)    Resp 22    Ht 5' 5\" (1.651 m)    Wt 107.4 kg (236 lb 12.4 oz)    SpO2 100%    BMI 39.4 kg/m2         Physical Exam:  General appearance: alert, cooperative, no distress, appears stated age  HEENT: cervical lymphadnopathy   Lungs: bases diminished bilaterally, no wheezes or rhonchi   Heart: regular rate and rhythm, S1, S2 normal, no murmur, click, rub or gallop  Abdomen: soft, non tender, non distended. Normoactive bowel sounds  Extremities: extremities normal, atraumatic, no cyanosis. Trace BLE edema   Skin: Skin color, texture, turgor normal. No rashes or lesions  Neurologic: Grossly normal  PSY: appropriately behaved        Intake and Output:  Current Shift:  08/18 0701 - 08/18 1900  In: 896 [P.O.:120;  I.V.:715]  Out: 800 [Urine:800]  Last three shifts:  08/16 1901 - 08/18 0700  In: 7022.2 [P.O.:720.3; I.V.:6301.9]  Out: 5791 [Urine:4280]    Recent Results (from the past 24 hour(s))   GLUCOSE, POC    Collection Time: 08/17/17  4:43 PM   Result Value Ref Range    Glucose (POC) 94 70 - 110 mg/dL   GLUCOSTABILIZER    Collection Time: 08/17/17  4:46 PM   Result Value Ref Range    Glucose 92 mg/dL    Insulin order 0.0 units/hour    Insulin adminstered 0.0 units/hour    Multiplier 0.000     Low target 140 mg/dL    High target 180 mg/dL    D50 order 0.0 ml    D50 administered 0.00 ml    Minutes until next BG 60 min    Order initials valery     Administered initials valery    GLUCOSE, POC    Collection Time: 08/17/17  5:58 PM   Result Value Ref Range    Glucose (POC) 80 70 - 110 mg/dL   GLUCOSTABILIZER    Collection Time: 08/17/17  5:58 PM   Result Value Ref Range    Glucose 80 mg/dL    Insulin order 0.0 units/hour    Insulin adminstered 0.0 units/hour    Multiplier 0.000     Low target 140 mg/dL    High target 180 mg/dL    D50 order 0.0 ml    D50 administered 0.00 ml    Minutes until next BG 60 min    Order initials valery     Administered initials valery    GLUCOSE, POC    Collection Time: 08/17/17  7:01 PM   Result Value Ref Range    Glucose (POC) 70 70 - 110 mg/dL   GLUCOSTABILIZER    Collection Time: 08/17/17  7:01 PM   Result Value Ref Range    Glucose 70 mg/dL    Insulin order 0.0 units/hour    Insulin adminstered 0.0 units/hour    Multiplier 0.000     Low target 140 mg/dL    High target 180 mg/dL    D50 order 12.0 ml    D50 administered 12.00 ml    Minutes until next BG 15 min    Order initials valery     Administered initials valery    GLUCOSE, POC    Collection Time: 08/17/17  7:23 PM   Result Value Ref Range    Glucose (POC) 95 70 - 110 mg/dL   GLUCOSTABILIZER    Collection Time: 08/17/17  7:23 PM   Result Value Ref Range    Glucose 95 mg/dL    Insulin order 0.0 units/hour    Insulin adminstered 0.0 units/hour    Multiplier 0.000     Low target 140 mg/dL    High target 180 mg/dL    D50 order 0.0 ml    D50 administered 0.00 ml    Minutes until next BG 60 min    Order initials cw     Administered initials cw    GLUCOSE, POC    Collection Time: 08/17/17  7:59 PM   Result Value Ref Range    Glucose (POC) 87 70 - 890 mg/dL   METABOLIC PANEL, BASIC    Collection Time: 08/17/17  8:36 PM   Result Value Ref Range    Sodium 140 136 - 145 mmol/L    Potassium 4.2 3.5 - 5.5 mmol/L    Chloride 110 (H) 100 - 108 mmol/L    CO2 21 21 - 32 mmol/L    Anion gap 9 3.0 - 18 mmol/L    Glucose 71 (L) 74 - 99 mg/dL    BUN 33 (H) 7.0 - 18 MG/DL    Creatinine 2.43 (H) 0.6 - 1.3 MG/DL    BUN/Creatinine ratio 14 12 - 20      GFR est AA 26 (L) >60 ml/min/1.73m2    GFR est non-AA 22 (L) >60 ml/min/1.73m2    Calcium 7.3 (L) 8.5 - 10.1 MG/DL   TROPONIN I    Collection Time: 08/17/17  8:36 PM   Result Value Ref Range    Troponin-I, Qt. 0.03 0.0 - 0.045 NG/ML   GLUCOSE, POC    Collection Time: 08/18/17 12:28 AM   Result Value Ref Range    Glucose (POC) 69 (L) 70 - 110 mg/dL   GLUCOSE, POC    Collection Time: 08/18/17  1:08 AM   Result Value Ref Range    Glucose (POC) 99 70 - 110 mg/dL   CBC WITH AUTOMATED DIFF    Collection Time: 08/18/17  3:50 AM   Result Value Ref Range    WBC 2.3 (L) 4.6 - 13.2 K/uL    RBC 3.46 (L) 4.20 - 5.30 M/uL    HGB 9.6 (L) 12.0 - 16.0 g/dL    HCT 28.5 (L) 35.0 - 45.0 %    MCV 82.4 74.0 - 97.0 FL    MCH 27.7 24.0 - 34.0 PG    MCHC 33.7 31.0 - 37.0 g/dL    RDW 16.5 (H) 11.6 - 14.5 %    PLATELET 052 887 - 374 K/uL    MPV 10.9 9.2 - 11.8 FL    NEUTROPHILS 0 (L) 40 - 73 %    LYMPHOCYTES 66 (H) 21 - 52 %    MONOCYTES 15 (H) 3 - 10 %    EOSINOPHILS 17 (H) 0 - 5 %    BASOPHILS 2 0 - 2 %    ABS. NEUTROPHILS 0.0 (L) 1.8 - 8.0 K/UL    ABS. LYMPHOCYTES 1.5 0.9 - 3.6 K/UL    ABS. MONOCYTES 0.3 0.05 - 1.2 K/UL    ABS. EOSINOPHILS 0.4 0.0 - 0.4 K/UL    ABS.  BASOPHILS 0.0 0.0 - 0.06 K/UL    DF AUTOMATED     PROTHROMBIN TIME + INR    Collection Time: 08/18/17  3:50 AM   Result Value Ref Range    Prothrombin time 14.1 11.5 - 15.2 sec    INR 1.1 0.8 - 1.2     METABOLIC PANEL, BASIC    Collection Time: 08/18/17  3:50 AM   Result Value Ref Range    Sodium 139 136 - 145 mmol/L    Potassium 4.1 3.5 - 5.5 mmol/L    Chloride 109 (H) 100 - 108 mmol/L    CO2 20 (L) 21 - 32 mmol/L    Anion gap 10 3.0 - 18 mmol/L    Glucose 66 (L) 74 - 99 mg/dL    BUN 29 (H) 7.0 - 18 MG/DL    Creatinine 2.33 (H) 0.6 - 1.3 MG/DL    BUN/Creatinine ratio 12 12 - 20      GFR est AA 28 (L) >60 ml/min/1.73m2    GFR est non-AA 23 (L) >60 ml/min/1.73m2    Calcium 6.5 (L) 8.5 - 10.1 MG/DL   GLUCOSE, POC    Collection Time: 08/18/17  5:52 AM   Result Value Ref Range Glucose (POC) 137 (H) 70 - 110 mg/dL   GLUCOSE, POC    Collection Time: 08/18/17 11:44 AM   Result Value Ref Range    Glucose (POC) 115 (H) 70 - 110 mg/dL   GLUCOSE, POC    Collection Time: 08/18/17  4:16 PM   Result Value Ref Range    Glucose (POC) 122 (H) 70 - 110 mg/dL           Lab Results   Component Value Date/Time    Glucose 66 08/18/2017 03:50 AM    Glucose 71 08/17/2017 08:36 PM    Glucose 283 08/17/2017 03:54 PM    Glucose 385 08/17/2017 07:09 AM    Glucose 591 08/17/2017 02:18 AM        Assessment/Plan:     Patient Active Problem List   Diagnosis Code    HIV (human immunodeficiency virus infection) ([facility] Z21    [facility] E11.9    Depressed F32.9    HTN (hypertension) I10    Pure hypercholesterolemia E78.00    CAD (coronary artery disease) I25.10    Stroke ([facility] I63.9    Migraine G43.909    Dehydration E86.0    Hyponatremia E87.1    Leukopenia D72.819    Anemia D64.9    Hyperglycemia R73.9    Dysphagia R13.10    Elevated troponin R74.8    SAVI (acute kidney injury) ([facility] N17.9    MRSA (methicillin resistant staph aureus) culture positive Z22.322    Neck pain on right side M54.2    Eosinophilia D72.1       A/P:  Tonsillitis without abscess: monitor airway. Zyvox, Clindamycon    MRSA bacteremia 2/2 to Right buttock abscess: ID following. Antibiotics as above  HIV: history of non compliance with HAART. Continue to hold HAART   HTN: Metoprolol, Losartan, Nifedipine ER. Monitor BP control   CAD: ASA  DM: SSI.  Monitor glucose control  CKD: monitor renal function   Hx CVA: Lipitor, Plavix   Morbid obesity; BMI 39.3   DVT prophylaxis: Heparin       ALECIA Harris 83  Pager:  [phone]  Office:  [phone]

## 2017-08-18 NOTE — DIABETES MGMT
GLYCEMIC CONTROL AND NUTRITION    Assessment/Recommendations:  Pt admitted 8/17 with hyperglycemia, placed on insulin drip and transitioned off drip last last night/early this morning, now receiving corrective lispro, normal insulin sensitivity ACHS. Noted episode of mild hypoglycemia (blood glucose- 69 mg/dL at 0028) and treated with D50% 12.5 g.   Blood glucose has since remained stable and in target range. GFR - 28  For now, suggest continuing corrective lispro ACHS as it appears to be providing adequate coverage at this time. Will monitor glycemic control, future potential need for basal insulin and feeding status. Most recent blood glucose values:  8/18/17:  66, 99, 137, 115  8/17/17: 598, 591, 565, 474, 437, 356, 266, 244, 206, 151, 121, 81, 90, 94, 80, 87     Current A1C of 9.8% is equivalent to average blood glucose of 234 mg/dl over the past 2-3 months. Current hospital diabetes medications:   corrective lispro, normal insulin sensitivity ACHS   Previous day's insulin requirements:    67.6 units regular insulin from insulin drip  Pt received 8.5 units insulin during last 4 hours of receiving insulin drip. Home diabetes medications:  Lantus (solostar pen) - 65 units/day  Humalog TID - pt does not know the dose without looking at her \"chart\".   Per Chart Everywhere chart review, pt was discharged from [facility] on 8/2/17 with instructions to take Lantus, 69 units/d and Humalog 42 units at breakfast, 17 units at lunch and 25 units at supper.    Diet:  Clear liquids  Education:  ____Refer to Diabetes Education Record             ___x_Education not indicated at this time      Dipehs Sanchez, [address] [place] [address], E   Office:  4 [facility] Pager:  [phone]

## 2017-08-18 NOTE — PROGRESS NOTES
Frederic zhao. She is in ICU but may transfer out today. Her plan is home when able. Case management to follow.

## 2017-08-18 NOTE — PROGRESS NOTES
conducted an initial consultation and Spiritual Assessment for Richy Siegel, who is a 43 y.o.,female. Patients Primary Language is: [place]. According to the patients EMR Judaism Affiliation is: Restoration. The reason the Patient came to the hospital is:   Patient Active Problem List    Diagnosis Date Noted    Dehydration 08/17/2017    Hyponatremia 08/17/2017    Leukopenia 08/17/2017    Anemia 08/17/2017    Hyperglycemia 08/17/2017    Dysphagia 08/17/2017    Elevated troponin 08/17/2017    SAVI (acute kidney injury) ([facility] 08/17/2017    MRSA (methicillin resistant staph aureus) culture positive 08/17/2017    Neck pain on right side 08/17/2017    Eosinophilia 08/17/2017    Stroke ([facility] 08/12/2015    Migraine 08/12/2015    CAD (coronary artery disease) 06/17/2015    HIV (human immunodeficiency virus infection) ([facility] 04/30/2013    Diabetes mellitus type 2, controlled ([facility] 04/30/2013    Depressed 04/30/2013    HTN (hypertension) 04/30/2013    Pure hypercholesterolemia 04/30/2013        The  provided the following Interventions:  Initiated a relationship of care and support. Explored issues of [place], spirituality and/or Religion needs while hospitalized. Listened empathically. Provided chaplaincy education. Provided information about Spiritual Care Services. Offered prayer and assurance of continued prayers on patient's behalf. Chart reviewed. The following outcomes were achieved:  Patient shared some information about their medical narrative and spiritual journey/beliefs. Patient processed feeling about current hospitalization. Patient expressed gratitude for the 's visit. Assessment:  Patient did not indicate any spiritual or Religion issues which require Spiritual Care Services interventions at this time. Patient does not have any Religion/cultural needs that will affect patients preferences in health care.     Plan:  Chaplains will continue to follow and will provide pastoral care on an as needed or requested basis.  recommends bedside caregivers page  on duty if patient shows signs of acute spiritual or emotional distress.    [address] [facility]   Staff [facility]   [phone]

## 2017-08-19 LAB
ANION GAP SERPL CALC-SCNC: 9 MMOL/L (ref 3–18)
ANION GAP SERPL CALC-SCNC: 9 MMOL/L (ref 3–18)
BACTERIA SPEC CULT: NORMAL
BASOPHILS # BLD: 0.1 K/UL (ref 0–0.06)
BASOPHILS # BLD: 0.1 K/UL (ref 0–0.1)
BASOPHILS NFR BLD: 2 % (ref 0–3)
BASOPHILS NFR BLD: 3 % (ref 0–2)
BUN SERPL-MCNC: 18 MG/DL (ref 7–18)
BUN SERPL-MCNC: 20 MG/DL (ref 7–18)
BUN/CREAT SERPL: 8 (ref 12–20)
BUN/CREAT SERPL: 9 (ref 12–20)
CALCIUM SERPL-MCNC: 7.3 MG/DL (ref 8.5–10.1)
CALCIUM SERPL-MCNC: 7.5 MG/DL (ref 8.5–10.1)
CHLORIDE SERPL-SCNC: 108 MMOL/L (ref 100–108)
CHLORIDE SERPL-SCNC: 109 MMOL/L (ref 100–108)
CO2 SERPL-SCNC: 17 MMOL/L (ref 21–32)
CO2 SERPL-SCNC: 19 MMOL/L (ref 21–32)
CREAT SERPL-MCNC: 2.19 MG/DL (ref 0.6–1.3)
CREAT SERPL-MCNC: 2.27 MG/DL (ref 0.6–1.3)
DIFFERENTIAL METHOD BLD: ABNORMAL
DIFFERENTIAL METHOD BLD: ABNORMAL
EOSINOPHIL # BLD: 0.3 K/UL (ref 0–0.4)
EOSINOPHIL # BLD: 0.3 K/UL (ref 0–0.4)
EOSINOPHIL NFR BLD: 7 % (ref 0–5)
EOSINOPHIL NFR BLD: 8 % (ref 0–5)
ERYTHROCYTE [DISTWIDTH] IN BLOOD BY AUTOMATED COUNT: 16.4 % (ref 11.6–14.5)
ERYTHROCYTE [DISTWIDTH] IN BLOOD BY AUTOMATED COUNT: 16.6 % (ref 11.6–14.5)
GLUCOSE BLD STRIP.AUTO-MCNC: 165 MG/DL (ref 70–110)
GLUCOSE BLD STRIP.AUTO-MCNC: 176 MG/DL (ref 70–110)
GLUCOSE BLD STRIP.AUTO-MCNC: 187 MG/DL (ref 70–110)
GLUCOSE BLD STRIP.AUTO-MCNC: 196 MG/DL (ref 70–110)
GLUCOSE SERPL-MCNC: 121 MG/DL (ref 74–99)
GLUCOSE SERPL-MCNC: 176 MG/DL (ref 74–99)
HCT VFR BLD AUTO: 30.7 % (ref 35–45)
HCT VFR BLD AUTO: 32.8 % (ref 35–45)
HGB BLD-MCNC: 10.3 G/DL (ref 12–16)
HGB BLD-MCNC: 10.8 G/DL (ref 12–16)
LYMPHOCYTES # BLD: 1.8 K/UL (ref 0.9–3.6)
LYMPHOCYTES # BLD: 2.5 K/UL (ref 0.8–3.5)
LYMPHOCYTES NFR BLD: 52 % (ref 21–52)
LYMPHOCYTES NFR BLD: 65 % (ref 20–51)
MCH RBC QN AUTO: 27.1 PG (ref 24–34)
MCH RBC QN AUTO: 27.8 PG (ref 24–34)
MCHC RBC AUTO-ENTMCNC: 32.9 G/DL (ref 31–37)
MCHC RBC AUTO-ENTMCNC: 33.6 G/DL (ref 31–37)
MCV RBC AUTO: 82.4 FL (ref 74–97)
MCV RBC AUTO: 83 FL (ref 74–97)
MONOCYTES # BLD: 0.8 K/UL (ref 0.05–1.2)
MONOCYTES # BLD: 0.9 K/UL (ref 0–1)
MONOCYTES NFR BLD: 23 % (ref 3–10)
MONOCYTES NFR BLD: 24 % (ref 2–9)
NEUTS SEG # BLD: 0.1 K/UL (ref 1.8–8)
NEUTS SEG # BLD: 0.5 K/UL (ref 1.8–8)
NEUTS SEG NFR BLD: 14 % (ref 40–73)
NEUTS SEG NFR BLD: 2 % (ref 42–75)
PLATELET # BLD AUTO: 328 K/UL (ref 135–420)
PLATELET # BLD AUTO: 345 K/UL (ref 135–420)
PMV BLD AUTO: 11.3 FL (ref 9.2–11.8)
PMV BLD AUTO: 11.8 FL (ref 9.2–11.8)
POTASSIUM SERPL-SCNC: 4.4 MMOL/L (ref 3.5–5.5)
POTASSIUM SERPL-SCNC: 4.6 MMOL/L (ref 3.5–5.5)
RBC # BLD AUTO: 3.7 M/UL (ref 4.2–5.3)
RBC # BLD AUTO: 3.98 M/UL (ref 4.2–5.3)
RBC MORPH BLD: ABNORMAL
SERVICE CMNT-IMP: NORMAL
SODIUM SERPL-SCNC: 135 MMOL/L (ref 136–145)
SODIUM SERPL-SCNC: 136 MMOL/L (ref 136–145)
WBC # BLD AUTO: 3.5 K/UL (ref 4.6–13.2)
WBC # BLD AUTO: 3.9 K/UL (ref 4.6–13.2)

## 2017-08-19 PROCEDURE — 87070 CULTURE OTHR SPECIMN AEROBIC: CPT | Performed by: INTERNAL MEDICINE

## 2017-08-19 PROCEDURE — 85025 COMPLETE CBC W/AUTO DIFF WBC: CPT | Performed by: FAMILY MEDICINE

## 2017-08-19 PROCEDURE — 74011000258 HC RX REV CODE- 258: Performed by: INTERNAL MEDICINE

## 2017-08-19 PROCEDURE — 80048 BASIC METABOLIC PNL TOTAL CA: CPT | Performed by: HOSPITALIST

## 2017-08-19 PROCEDURE — 97116 GAIT TRAINING THERAPY: CPT

## 2017-08-19 PROCEDURE — 74011636637 HC RX REV CODE- 636/637: Performed by: HOSPITALIST

## 2017-08-19 PROCEDURE — 97161 PT EVAL LOW COMPLEX 20 MIN: CPT

## 2017-08-19 PROCEDURE — 74011250637 HC RX REV CODE- 250/637: Performed by: HOSPITALIST

## 2017-08-19 PROCEDURE — 74011250636 HC RX REV CODE- 250/636: Performed by: INTERNAL MEDICINE

## 2017-08-19 PROCEDURE — 74011250636 HC RX REV CODE- 250/636: Performed by: FAMILY MEDICINE

## 2017-08-19 PROCEDURE — 80048 BASIC METABOLIC PNL TOTAL CA: CPT | Performed by: FAMILY MEDICINE

## 2017-08-19 PROCEDURE — 74011000250 HC RX REV CODE- 250: Performed by: INTERNAL MEDICINE

## 2017-08-19 PROCEDURE — 65660000000 HC RM CCU STEPDOWN

## 2017-08-19 PROCEDURE — 36415 COLL VENOUS BLD VENIPUNCTURE: CPT | Performed by: FAMILY MEDICINE

## 2017-08-19 PROCEDURE — 82962 GLUCOSE BLOOD TEST: CPT

## 2017-08-19 PROCEDURE — 74011250637 HC RX REV CODE- 250/637: Performed by: NURSE PRACTITIONER

## 2017-08-19 RX ORDER — ATORVASTATIN CALCIUM 40 MG/1
80 TABLET, FILM COATED ORAL DAILY
Status: DISCONTINUED | OUTPATIENT
Start: 2017-08-19 | End: 2017-08-22 | Stop reason: HOSPADM

## 2017-08-19 RX ORDER — CLOPIDOGREL BISULFATE 75 MG/1
75 TABLET ORAL DAILY
Status: DISCONTINUED | OUTPATIENT
Start: 2017-08-19 | End: 2017-08-22 | Stop reason: HOSPADM

## 2017-08-19 RX ORDER — LOSARTAN POTASSIUM 50 MG/1
50 TABLET ORAL DAILY
Status: DISCONTINUED | OUTPATIENT
Start: 2017-08-19 | End: 2017-08-22 | Stop reason: HOSPADM

## 2017-08-19 RX ORDER — METOPROLOL TARTRATE 50 MG/1
100 TABLET ORAL 2 TIMES DAILY
Status: DISCONTINUED | OUTPATIENT
Start: 2017-08-19 | End: 2017-08-22 | Stop reason: HOSPADM

## 2017-08-19 RX ORDER — GUAIFENESIN 100 MG/5ML
81 LIQUID (ML) ORAL DAILY
Status: DISCONTINUED | OUTPATIENT
Start: 2017-08-19 | End: 2017-08-22 | Stop reason: HOSPADM

## 2017-08-19 RX ORDER — NIFEDIPINE 30 MG/1
30 TABLET, EXTENDED RELEASE ORAL DAILY
Status: DISCONTINUED | OUTPATIENT
Start: 2017-08-19 | End: 2017-08-22 | Stop reason: HOSPADM

## 2017-08-19 RX ORDER — ACETAMINOPHEN 325 MG/1
650 TABLET ORAL
Status: DISCONTINUED | OUTPATIENT
Start: 2017-08-19 | End: 2017-08-22 | Stop reason: HOSPADM

## 2017-08-19 RX ADMIN — LOSARTAN POTASSIUM 50 MG: 50 TABLET ORAL at 09:14

## 2017-08-19 RX ADMIN — HEPARIN SODIUM 5000 UNITS: 5000 INJECTION, SOLUTION INTRAVENOUS; SUBCUTANEOUS at 08:35

## 2017-08-19 RX ADMIN — HEPARIN SODIUM 5000 UNITS: 5000 INJECTION, SOLUTION INTRAVENOUS; SUBCUTANEOUS at 16:35

## 2017-08-19 RX ADMIN — INSULIN LISPRO 2 UNITS: 100 INJECTION, SOLUTION INTRAVENOUS; SUBCUTANEOUS at 17:24

## 2017-08-19 RX ADMIN — MORPHINE SULFATE 2 MG: 2 INJECTION, SOLUTION INTRAMUSCULAR; INTRAVENOUS at 17:31

## 2017-08-19 RX ADMIN — METOPROLOL TARTRATE 100 MG: 50 TABLET ORAL at 08:51

## 2017-08-19 RX ADMIN — MORPHINE SULFATE 4 MG: 2 INJECTION, SOLUTION INTRAMUSCULAR; INTRAVENOUS at 08:29

## 2017-08-19 RX ADMIN — HEPARIN SODIUM 5000 UNITS: 5000 INJECTION, SOLUTION INTRAVENOUS; SUBCUTANEOUS at 22:45

## 2017-08-19 RX ADMIN — MORPHINE SULFATE 4 MG: 2 INJECTION, SOLUTION INTRAMUSCULAR; INTRAVENOUS at 20:20

## 2017-08-19 RX ADMIN — INSULIN LISPRO 2 UNITS: 100 INJECTION, SOLUTION INTRAVENOUS; SUBCUTANEOUS at 12:07

## 2017-08-19 RX ADMIN — ATORVASTATIN CALCIUM 80 MG: 40 TABLET, FILM COATED ORAL at 08:52

## 2017-08-19 RX ADMIN — ASPIRIN 81 MG CHEWABLE TABLET 81 MG: 81 TABLET CHEWABLE at 08:48

## 2017-08-19 RX ADMIN — NIFEDIPINE 30 MG: 30 TABLET, FILM COATED, EXTENDED RELEASE ORAL at 09:12

## 2017-08-19 RX ADMIN — ACETAMINOPHEN 650 MG: 325 TABLET, FILM COATED ORAL at 17:35

## 2017-08-19 RX ADMIN — INSULIN LISPRO 2 UNITS: 100 INJECTION, SOLUTION INTRAVENOUS; SUBCUTANEOUS at 21:07

## 2017-08-19 RX ADMIN — MORPHINE SULFATE 4 MG: 2 INJECTION, SOLUTION INTRAMUSCULAR; INTRAVENOUS at 22:46

## 2017-08-19 RX ADMIN — ACETAMINOPHEN 650 MG: 325 TABLET, FILM COATED ORAL at 10:16

## 2017-08-19 RX ADMIN — CLINDAMYCIN PHOSPHATE 600 MG: 150 INJECTION, SOLUTION, CONCENTRATE INTRAVENOUS at 08:39

## 2017-08-19 RX ADMIN — MORPHINE SULFATE 4 MG: 2 INJECTION, SOLUTION INTRAMUSCULAR; INTRAVENOUS at 03:37

## 2017-08-19 RX ADMIN — INSULIN LISPRO 2 UNITS: 100 INJECTION, SOLUTION INTRAVENOUS; SUBCUTANEOUS at 07:30

## 2017-08-19 RX ADMIN — CLINDAMYCIN PHOSPHATE 600 MG: 150 INJECTION, SOLUTION, CONCENTRATE INTRAVENOUS at 16:34

## 2017-08-19 RX ADMIN — CLOPIDOGREL BISULFATE 75 MG: 75 TABLET ORAL at 08:52

## 2017-08-19 RX ADMIN — METOPROLOL TARTRATE 100 MG: 50 TABLET ORAL at 17:34

## 2017-08-19 NOTE — PROGRESS NOTES
Progress Note      Patient:  Guerda Lr               Sex: female          DOA: 8/17/2017       YOB: 1975      Age:  43 y.o.        LOS:  LOS: 2 days             CHIEF COMPLAINT:  Tonsilitis, Neutropenia, HIV infection    Subjective:     Patient feels pain is better controlled today  No SOB  No chest pain    Objective:      Visit Vitals    /78 (BP 1 Location: Left arm)    Pulse 96    Temp 99.5 °F (37.5 °C)    Resp 20    Ht 5' 5\" (1.651 m)    Wt 106.6 kg (235 lb)    SpO2 98%    BMI 39.11 kg/m2       Physical Exam:  Gen:  No distress, no complaint  Lungs:  Clear bilaterally, no wheeze or rhonchi  Heart:  Regular rate and rhythm, no murmurs or gallops  Abdomen:  Soft, non-tender, normal bowel sounds        Lab/Data Reviewed:  BMP:   Lab Results   Component Value Date/Time     (L) 08/19/2017 09:25 AM    K 4.6 08/19/2017 09:25 AM     (H) 08/19/2017 09:25 AM    CO2 17 (L) 08/19/2017 09:25 AM    AGAP 9 08/19/2017 09:25 AM     (H) 08/19/2017 09:25 AM    BUN 18 08/19/2017 09:25 AM    CREA 2.27 (H) 08/19/2017 09:25 AM    GFRAA 29 (L) 08/19/2017 09:25 AM    GFRNA 24 (L) 08/19/2017 09:25 AM     CBC:   Lab Results   Component Value Date/Time    WBC 3.5 (L) 08/19/2017 09:25 AM    HGB 10.8 (L) 08/19/2017 09:25 AM    HCT 32.8 (L) 08/19/2017 09:25 AM     08/19/2017 09:25 AM           Assessment/Plan     Principal Problem:    Neck pain on right side (8/17/2017)    Active Problems:    HIV (human immunodeficiency virus infection) ([facility] (4/30/2013)      Dehydration (8/17/2017)      Hyponatremia (8/17/2017)      Leukopenia (8/17/2017)      Anemia (8/17/2017)      Hyperglycemia (8/17/2017)      Dysphagia (8/17/2017)      Elevated troponin (8/17/2017)      SAVI (acute kidney injury) ([facility] (8/17/2017)      MRSA (methicillin resistant staph aureus) culture positive (8/17/2017)      Eosinophilia (8/17/2017)        Plan:  Continue with antibiotics  Discussed with ID, patient has continued with Fever  She has no SOB currently  Following closely  Discussed with patient and family at the bedside.

## 2017-08-19 NOTE — PROGRESS NOTES
SBAR was given to Mirtha Fonseca RN on night shift (7p-7a). Pt was resting in bed with no current needs.

## 2017-08-19 NOTE — PROGRESS NOTES
Problem: Falls - Risk of  Goal: *Absence of Falls  Document Don Fall Risk and appropriate interventions in the flowsheet.    Outcome: Progressing Towards Goal  Fall Risk Interventions:  Mobility Interventions: Patient to call before getting OOB           Medication Interventions: Patient to call before getting OOB, Teach patient to arise slowly     Elimination Interventions: Call light in reach, Patient to call for help with toileting needs

## 2017-08-19 NOTE — PROGRESS NOTES
Infectious Disease Follow-up     Admit Date: 8/17/2017     Will plan to check back on Monday 8/21/2017. I am on call and can be reached at [phone] if needed. Current abx Prior abx    Clindamycin 8/18 - 1 Teflaro x 4 weeks since 7/19, levoflox/ zosyn 8/17 x 1, Zyvox 8/17 - 1       ASSESSMENT: -- RECOMMENDATIONS       Fever- high grade in immunocompromised host  - still spiking high grade and WBC remains low  - Blcx and ucx neg and throat cx neg for strep screen - unclear source but will need to DC PICC given persistent fever  - Will send tip for cx  - If still continue to spike, will need to look for other source  - d/w Dr [place] sided tonsillitis without abscess   - Infectious Mononucleosis (EBV reactivation by serology but denies prior episode) - no GAStrep,   - on exam rt swollen tonsils with white patches on it, cervical LN +, +odynophagia. No thrush. - CT neck with tonsillitis, no abscess   - Strep screen No GAS,   -  EBV VCAS IgM neg, EA IgG +, VCA IgG +, NA IgG+ (Reactivation >> Past Infection) but Mono screen negative ->on Clindamycin (empiric coverage until better)  -> symptomatic rx per IM   Leukopenia with eosinophilia  - known chronic leukocytosis now with neutropenia /leukopenia due EBV infection  - also had 30% eosinophilia ?  Drug reaction to Teflaro  - monitor for now   Recent MRSA BSI from rt buttocks abscess at [facility]  - admitted 7/16-8/2, blcx 7/16 2/2 positive, repeat 7/19 neg   - s/p I and D   - PICC 7/24 placed, sent out on Teflaro for total 4 weeks  - monitor Blcx here   - d/c PICC   HIV since 1999  - Follows [facility]   - last CD4 607/ 19.6%, VL 5660 [ 6/28/17 ]  - ART - Tenofovir 25mg daily, emtricitabine 200mg q 72hr, raltegravir 400mg bid   - h/o non compliance, currently also non compliant , says did not get prescription of tenofovir, emtricitabine since discharge [ issue with pharmacy ], hence not taking any ART since discharge. -> hold ART since not taking anyway, will ask to resume when she has all meds at home  - f/up [facility] after discharge [ has appointment on 8/22 ]   CKD stage 3  - baseline cr around 3      DM type 2 uncontrolled - hba1c 9.8%     CAD s/p CABG x 4 in 2015     Comorbid :- HTN, TIA, HLP        MICROBIOLOGY:   [ [facility] 7/16 Blcx - 2/2 MRSA, 7/19 Blcx x 2 NTD, TTE neg ]      8/17  Blcx x 2 - ntd           Ucx - no growth   Throat cx neg strep     LINES/DRAINS:   Rt chest PICC 7/24 at [facility] Problems    Diagnosis Date Noted    Dehydration 08/17/2017    Hyponatremia 08/17/2017    Leukopenia 08/17/2017    Anemia 08/17/2017    Hyperglycemia 08/17/2017    Dysphagia 08/17/2017    Elevated troponin 08/17/2017    [facility] 08/17/2017    MRSA (methicillin resistant staph aureus) culture positive 08/17/2017    Neck pain on right side 08/17/2017    Eosinophilia 08/17/2017    HIV (human immunodeficiency virus infection) ([facility] 04/30/2013         Subjective: Interval notes reviewed. Says right throat pain is better today. Denies fever but spiking high grade. Says able to eat. D/w her that will dc PICC. Family at bedside but all sleeping.       Current Facility-Administered Medications   Medication Dose Route Frequency    aspirin chewable tablet 81 mg  81 mg Oral DAILY    atorvastatin (LIPITOR) tablet 80 mg  80 mg Oral DAILY    losartan (COZAAR) tablet 50 mg  50 mg Oral DAILY    metoprolol tartrate (LOPRESSOR) tablet 100 mg  100 mg Oral BID    NIFEdipine ER (PROCARDIA XL) tablet 30 mg  30 mg Oral DAILY    clopidogrel (PLAVIX) tablet 75 mg  75 mg Oral DAILY    acetaminophen (TYLENOL) tablet 650 mg  650 mg Oral Q6H PRN    morphine injection 1-4 mg  1-4 mg IntraVENous Q2H PRN    lidocaine (XYLOCAINE) 2 % viscous solution 5 mL  5 mL Mouth/Throat TID PRN    insulin lispro (HUMALOG) injection   SubCUTAneous AC&HS    clindamycin phosphate (CLEOCIN) 600 mg in 0.9% sodium chloride (MBP/ADV) 100 mL ADV  600 mg IntraVENous Q8H    glucose chewable tablet 16 g  4 Tab Oral PRN    glucagon (GLUCAGEN) injection 1 mg  1 mg IntraMUSCular PRN    dextrose (D50W) injection syrg 12.5-25 g  25-50 mL IntraVENous PRN    heparin (porcine) injection 5,000 Units  5,000 Units SubCUTAneous Q8H    acetaminophen (TYLENOL) solution 650 mg  650 mg Oral Q4H PRN    acetaminophen (TYLENOL) suppository 650 mg  650 mg Rectal Q4H PRN         Objective:     Visit Vitals    /74 (BP 1 Location: Left arm)    Pulse (!) 107  Comment: emily RUBIOis aware    Temp (!) 100.7 °F (38.2 °C)  Comment: emily NUÑEZ is aware    Resp 18    Ht 5' 5\" (1.651 m)    Wt 102.4 kg (225 lb 12.8 oz)    SpO2 96%    BMI 37.58 kg/m2       Temp (24hrs), Av.5 °F (38.1 °C), Min:98.9 °F (37.2 °C), Max:102.9 °F (39.4 °C)      General: Well developed, obese 43 y.o. [place] female in distress from throat pain  ENT: ENT exam normal, no neck nodes or sinus tenderness  Head: normocephalic, without obvious abnormality  Mouth:  could not visualize tonsils today - large tongue  Neck: rt sided jugular lymphadenopathy   Cardio:  regular rate and rhythm, S1, S2 normal, no murmur  Chest: mild line scar from previous CABG, rt chest PICC intact - has come out 1-2 cm but no drainage or erythema  Lungs: clear to auscultation, no wheezes or rales and unlabored breathing  Abdomen: soft, non-tender. Bowel sounds normal. No masses, no organomegaly.   Extremities:  no redness or tenderness in the calves or thighs, no edema  Neuro: Grossly normal      Labs: Results:   Chemistry Recent Labs      17   0925  17   0410  17   0350   17   0218   GLU  176*  121*  66*   < >  591*   NA  135*  136  139   < >  126*   K  4.6  4.4  4.1   < >  4.4   CL  109*  108  109*   < >  92*   CO2  17*  19*  20*   < >  25   BUN  18  20*  29*   < >  55*   CREA  2.27*  2.19*  2.33*   < >  3.79*   CA  7.3*  7.5*  6.5*   < >  7.4*   AGAP  9  9  10   < >  9   BUCR  8*  9*  12   < >  15   AP   --    --    --    -- 103   TP   --    --    --    --   7.3   ALB   --    --    --    --   2.3*   GLOB   --    --    --    --   5.0*   AGRAT   --    --    --    --   0.5*    < > = values in this interval not displayed. CBC w/Diff Recent Labs      08/19/17   0925  08/19/17   0410  08/18/17   0350   WBC  3.5*  3.9*  2.3*   RBC  3.98*  3.70*  3.46*   HGB  10.8*  10.3*  9.6*   HCT  32.8*  30.7*  28.5*   PLT  345  328  268   GRANS  14*  2*  0*   LYMPH  52  65*  66*   EOS  8*  7*  17*        8/18/2017 12:38 PM - Eugenio, Lab In [facility]       Component Results      Component Value Flag Ref Range Units Status     EBV Ab VCA, IgM <36.0  0.0 - 35.9 U/mL Final     Comment:     (NOTE)                                   Negative        <36.0                                   Equivocal 36.0 - 43.9                                   Positive        >43.9        EBV Early Antigen Ab, IgG >150.0 (H) 0.0 - 8.9 U/mL Final     Comment:     (NOTE)                                   Negative        < 9.0                                   Equivocal  9.0 - 10.9                                   Positive        >10.9        EBV Ab VCA, IgG >600.0 (H) 0.0 - 17.9 U/mL Final     Comment:     (NOTE)                                   Negative        <18.0                                   Equivocal 18.0 - 21. 9                                   Positive        >21.9        EBV Nuclear Antigen Ab, IgG >600.0 (H) 0.0 - 17.9 U/mL Final     Comment:     (NOTE)                                   Negative        <18.0                                   Equivocal 18.0 - 21. 9                                   Positive        >21.9          Microbiology Results  Recent Labs      08/17/17   1554  08/17/17   0435  08/17/17   0218   CULT  CULTURE IN PROGRESS,FURTHER UPDATES TO FOLLOW  NO GROWTH 2 DAYS  NO GROWTH 2 DAYS       Roney Coon MD  August 19, 2017  [facility] AT THE [facility]  [phone]

## 2017-08-19 NOTE — ROUTINE PROCESS
At the request of primary RN Marquez Peterson, PIVx2 placed and PICC removed. Patient was in flat position in bed, catheter pulled during exhalation/valsalva. Sterility of tip maintained, clipped with sterile scissors into sterile specimen cup. Instructed RN rosie JONES to keep HOB flat for 30-60 mins.

## 2017-08-19 NOTE — PROGRESS NOTES
Problem: Diabetes Self-Management  Goal: *Patient Specific Goal (EDIT GOAL, INSERT TEXT)  Pt will verbalize back the importance of a healthy diet immediately after verbal teachings. Problem: Falls - Risk of  Goal: *Absence of Falls  Document Don Fall Risk and appropriate interventions in the flowsheet.    Outcome: Progressing Towards Goal  Fall Risk Interventions:  Mobility Interventions: Patient to call before getting OOB           Medication Interventions: Patient to call before getting OOB, Teach patient to arise slowly     Elimination Interventions: Call light in reach, Patient to call for help with toileting needs     History of Falls Interventions: Door open when patient unattended

## 2017-08-19 NOTE — PROGRESS NOTES
Problem: Mobility Impaired (Adult and Pediatric)  Goal: *Acute Goals and Plan of Care (Insert Text)  Physical Therapy Goals  Initiated 8/19/2017 and to be accomplished within 7 day(s)  1. Patient will move from supine to sit and sit to supine , scoot up and down and roll side to side in bed with modified independence. 2. Patient will transfer from bed to chair and chair to bed with modified independence using the least restrictive device. 3. Patient will perform sit to stand with modified independence. 4. Patient will ambulate with modified independence for >/= 150 feet with the least restrictive device. 5. Patient will ascend/descend 3 stairs with no handrail(s) with modified independence. PHYSICAL THERAPY EVALUATION     Patient: Lore Nunez (43 y.o. female)  Date: 8/19/2017  Primary Diagnosis: Hyperglycemia  Hyponatremia  Anemia  SAVI (acute kidney injury) ([facility] [address]  Dehydration  HIV (human immunodeficiency virus infection) ([facility] [address]  Dysphagia  Leukopenia  Elevated troponin  MRSA (methicillin resistant staph aureus) culture positive  Elevated troponin  ASVI (acute kidney injury) ([facility] [address]  Dehydration  Hyponatremia  Anemia  Dysphagia  HIV (human immunodeficiency virus infection) (HCC)  Leukopenia  Hyperglycemia        Precautions: Neutropenic Precaution        ASSESSMENT :  Patient requires between supervision/set-up and minimal assistance/contact guard assist for bed mobility, transfers and ambulation. Decreased activity tolerance with c/o generalized weakness and pain 5/10. Unsafe gait without assistance. Patient presents with deficits in:   Bed Mobility, Transfers, Gait, Strength, Balance and Stairs     Patient will benefit from skilled intervention to address the above impairments.   Patients rehabilitation potential is considered to be Good  Factors which may influence rehabilitation potential include:   [ ]         None noted  [ ]         Mental ability/status  [X]         Medical condition  [ ] Home/family situation and support systems  [ ]         Safety awareness  [ ]         Pain tolerance/management  [ ]         Other:      Recommendations for nursing:   Written on communication board: OOB with assist of 1  Verbally communicated to: nurse ROBERT       PLAN :  Recommendations and Planned Interventions:  [X]           Bed Mobility Training             [ ]    Neuromuscular Re-Education  [X]           Transfer Training                   [ ]    Orthotic/Prosthetic Training  [X]           Gait Training                          [ ]    Modalities  [ ]           Therapeutic Exercises          [ ]    Edema Management/Control  [ ]           Therapeutic Activities            [X]    Patient and Family   Training/Education  [X]           Other (comment):Plan of care, Bed Mobility, Transfer, Gait     Frequency/Duration: Patient will be followed by physical therapy 1 time per day/3-5 days per week to address goals. Discharge Recommendations: Home Health  Further Equipment Recommendations for Discharge: ? Rolling walker or straight cane       SUBJECTIVE:   Patient stated I don't feel well.   Patient c/o generalized weakness and pain right neck area.       OBJECTIVE DATA SUMMARY:       Past Medical History:   Diagnosis Date    Carpal tunnel syndrome 2013    Depressed 2013    Diabetes ([facility]      HIV (human immunodeficiency virus infection) ([facility]      HIV (human immunodeficiency virus infection) ([facility] [address] [facility] 2013    HTN (hypertension) 2013    Hypertension      Migraine 2015    Pure hypercholesterolemia 2013    Stroke ([facility] [address] [facility] 2015    Type II or unspecified type diabetes mellitus without mention of complication, not stated as uncontrolled 2013     Past Surgical History:   Procedure Laterality Date    CARDIAC SURG PROCEDURE UNLIST        2 vessel cabg    HX GYN            HX HEENT         Left Eye    HX ORTHOPAEDIC         Right Hand Carpal Tunnel      Barriers to Learning/Limitations: none  Compensate with: visual, verbal, tactile, kinesthetic cues/model     G CODE:  Mobility [id number] Current  CL= 60-79%   Goal  CI= 1-19%. The severity rating is based on the Other [employer] Balance Scale     Eval Complexity: History: LOW Complexity : Zero comorbidities / personal factors that will impact the outcome / POCExam:MEDIUM Complexity : 3 Standardized tests and measures addressing body structure, function, activity limitation and / or participation in recreation  Presentation: LOW Complexity : Stable, uncomplicated  Clinical Decision Making:Medium Complexity [facility] Standing Balance Scale Overall Complexity:LOW      [address] Sitting Balance Scale  0: Pt performs 25% or less of sitting activity (Max assist) CN, 100% impaired. 1: Pt supports self with upper extremities but requires therapist assistance. Pt performs 25-50% of effort (Mod assist) CM, 80% to <100% impaired. 1+: Pt supports self with upper extremities but requires therapist assistance. Pt performs >50% effort. (Min assist). CL, 60% to <80% impaired. 2: Pt supports self independently with both upper extremities. CL, 60% to <80% impaired. 2+: Pt support self independently with 1 upper extremity. CK, 40% to <60% impaired. 3: Pt sits without upper extremity support for up to 30 seconds. CK, 40% to <60% impaired. 3+: Pt sits without upper extremity support for 30 seconds or greater. CJ, 20% to <40% impaired. 4: Pt moves and returns trunkal midpoint 1-2 inches in one plane. CJ, 20% to <40% impaired. 4+: Pt moves and returns trunkal midpoint 1-2 inches in multiple planes. CI, 1% to <20% impaired. 5: Pt moves and returns trunkal midpoint in all planes greater than 2 inches. CH, 0% impaired. [address] Standing Balance Scale  0: Pt performs 25% or less of standing activity (Max assist) CN, 100% impaired.   1: Pt supports self with upper extremities but requires therapist assistance. Pt performs 25-50% of effort (Mod assist) CM, 80% to <100% impaired. 1+: Pt supports self with upper extremities but requires therapist assistance. Pt performs >50% effort. (Min assist). CL, 60% to <80% impaired. 2: Pt supports self independently with both upper extremities (walker, crutches, parallel bars). CL, 60% to <80% impaired. 2+: Pt support self independently with 1 upper extremity (cane, crutch, 1 parallel bar). CK, 40% to <60% impaired. 3: Pt stands without upper extremity support for up to 30 seconds. CK, 40% to <60% impaired. 3+: Pt stands without upper extremity support for 30 seconds or greater. CJ, 20% to <40% impaired. 4: Pt independently moves and returns center of gravity 1-2 inches in one plane. CJ, 20% to <40% impaired. 4+: Pt independently moves and returns center of gravity 1-2 inches in multiple planes. CI, 1% to <20% impaired. 5: Pt independently moves and returns center of gravity in all planes greater than 2 inches. CH, 0% impaired. Prior Level of Function/Home Situation: Independent gait with no device. Independent with ADLs  Home Situation  Home Environment: Private residence  One/Two Story Residence: One story  Living Alone: No  Support Systems: Child(ambar), Family member(s)  Patient Expects to be Discharged to[de-identified] Private residence  Current DME Used/Available at Home: None  Critical Behavior:  Neurologic State: Alert; Appropriate for age  Orientation Level: Oriented X4  Cognition: Follows commands  Safety/Judgement: Awareness of environment; Fall prevention  Psychosocial  Patient Behaviors: Calm; Cooperative  Family  Behaviors: Calm; Cooperative;Supportive  Visitor Behaviors: Calm; Cooperative  Needs Expressed: Cultural  Purposeful Interaction: Yes  Pt Identified Daily Priority: Clinical issues (comment)  Caritas Process: Nurture loving kindness  Caring Interventions: Reassure  Reassure: Informing;Support family; Therapeutic listening  Therapeutic Modalities: Intentional therapeutic touch  Skin Condition/Temp: Cool;Peeling; Warm  Family  Behaviors: Calm; Cooperative;Supportive  Skin Integrity: Intact  Skin Integumentary  Skin Color: Appropriate for ethnicity  Skin Condition/Temp: Cool;Peeling; Warm  Skin Integrity: Intact  Turgor: Non-tenting  Hair Growth: Present  Varicosities: Absent     Strength:    Strength: Generally decreased, functional (both LE)      Tone & Sensation:   Tone: Normal       Range Of Motion:  AROM: Within functional limits      Functional Mobility:  Bed Mobility:  Rolling: Supervision  Supine to Sit: Stand-by asssistance  Sit to Supine: Stand-by asssistance  Scooting: Supervision  Transfers:  Sit to Stand: Contact guard assistance  Stand to Sit: Contact guard assistance  Balance:   Sitting: Without support  Sitting - Static: Fair (occasional) (Plus)  Sitting - Dynamic: Fair (occasional)  Standing: With support  Standing - Static: Fair (Minus)  Standing - Dynamic : Poor (+/Fair -)  Ambulation/Gait Training:  Distance (ft): 50 Feet (ft)  Assistive Device: Other (comment) (HHA)  Ambulation - Level of Assistance: Contact guard assistance;Minimal assistance  Gait Abnormalities: Decreased step clearance; Path deviations;Trunk sway increased  Base of Support: Center of gravity altered  Speed/Emmie: Fluctuations  Step Length: Left shortened;Right shortened     Pain:  Pre treatment pain level:   5 right neck area  Post treatment pain level:   5 right neck area  Pain Scale 1: Numeric (0 - 10)  Pain Intensity 1: 5  Pain Location 1: Ear;Jaw; Throat  Pain Orientation 1: Right  Pain Description 1: Throbbing  Pain Intervention(s) 1: Medication (see MAR); Relaxation technique      Activity Tolerance:   Fair -  Please refer to the flowsheet for vital signs taken during this treatment.   After treatment:   [ ]         Patient left in no apparent distress sitting up in chair  [X]         Patient left in no apparent distress in bed  [X]         Call bell left within reach  [X] Nursing notified  [ ]         Caregiver present  [ ]         Bed alarm activated      COMMUNICATION/EDUCATION:   [X]         Fall prevention education was provided and the patient/caregiver indicated understanding. [X]         Patient/family have participated as able in goal setting and plan of care. [X]         Patient/family agree to work toward stated goals and plan of care. [ ]         Patient understands intent and goals of therapy, but is neutral about his/her participation. [ ]         Patient is unable to participate in goal setting and plan of care.      Thank you for this referral.  Tarsha Mccann, PT   Time Calculation: 24 mins

## 2017-08-19 NOTE — ROUTINE PROCESS
1930: Assumed care. Sleeping. Assessment done. Denies any pain or discomfort at this time. Call light within reach. Family at bedside. 2220: Concerned about patient 's BP being elevated. On 3 BP medications at home. Not ordered/not taking since admission. Pagekarla ARITA on call. 2230: Dr. Richard Thomas called back. Informed of the above situations. Orders obtained to give Cozaar 50 mg every day, Lopressor 100 mg BID, Nifedipine ER 30 mg every day. 2317: Due meds given. BS 82. No coverage given per protocol. 2337: Medicated for pain per patient request. Will continue to monitor. 6140: BP improved. 4328: Medicated for pain per patient request. Will continue to monitor. 0971: Sleeping.   0645: Slept good thru night. Needs attended. 0715: Bedside and Verbal shift change report given to Myrna Nazario RN (oncoming nurse) by Aman Barry RN (offgoing nurse). Report included the following information SBAR, Kardex, Intake/Output, MAR and Recent Results.

## 2017-08-20 PROBLEM — R73.9 HYPERGLYCEMIA: Status: RESOLVED | Noted: 2017-08-17 | Resolved: 2017-08-20

## 2017-08-20 PROBLEM — E87.1 HYPONATREMIA: Status: RESOLVED | Noted: 2017-08-17 | Resolved: 2017-08-20

## 2017-08-20 PROBLEM — J03.90 TONSILLITIS: Status: ACTIVE | Noted: 2017-08-20

## 2017-08-20 PROBLEM — R77.8 ELEVATED TROPONIN: Status: RESOLVED | Noted: 2017-08-17 | Resolved: 2017-08-20

## 2017-08-20 PROBLEM — D72.10 EOSINOPHILIA: Status: RESOLVED | Noted: 2017-08-17 | Resolved: 2017-08-20

## 2017-08-20 PROBLEM — M54.2 NECK PAIN ON RIGHT SIDE: Status: RESOLVED | Noted: 2017-08-17 | Resolved: 2017-08-20

## 2017-08-20 PROBLEM — E86.0 DEHYDRATION: Status: RESOLVED | Noted: 2017-08-17 | Resolved: 2017-08-20

## 2017-08-20 LAB
ANION GAP SERPL CALC-SCNC: 10 MMOL/L (ref 3–18)
B-HEM STREP THROAT QL CULT: NEGATIVE
BACTERIA SPEC CULT: ABNORMAL
BACTERIA SPEC CULT: ABNORMAL
BUN SERPL-MCNC: 18 MG/DL (ref 7–18)
BUN/CREAT SERPL: 8 (ref 12–20)
CALCIUM SERPL-MCNC: 7.3 MG/DL (ref 8.5–10.1)
CHLORIDE SERPL-SCNC: 110 MMOL/L (ref 100–108)
CO2 SERPL-SCNC: 19 MMOL/L (ref 21–32)
CREAT SERPL-MCNC: 2.21 MG/DL (ref 0.6–1.3)
GLUCOSE BLD STRIP.AUTO-MCNC: 137 MG/DL (ref 70–110)
GLUCOSE BLD STRIP.AUTO-MCNC: 198 MG/DL (ref 70–110)
GLUCOSE BLD STRIP.AUTO-MCNC: 272 MG/DL (ref 70–110)
GLUCOSE BLD STRIP.AUTO-MCNC: 275 MG/DL (ref 70–110)
GLUCOSE SERPL-MCNC: 154 MG/DL (ref 74–99)
POTASSIUM SERPL-SCNC: 4.1 MMOL/L (ref 3.5–5.5)
SERVICE CMNT-IMP: ABNORMAL
SODIUM SERPL-SCNC: 139 MMOL/L (ref 136–145)

## 2017-08-20 PROCEDURE — 74011000250 HC RX REV CODE- 250: Performed by: HOSPITALIST

## 2017-08-20 PROCEDURE — 82962 GLUCOSE BLOOD TEST: CPT

## 2017-08-20 PROCEDURE — 74011000258 HC RX REV CODE- 258: Performed by: INTERNAL MEDICINE

## 2017-08-20 PROCEDURE — 36415 COLL VENOUS BLD VENIPUNCTURE: CPT | Performed by: HOSPITALIST

## 2017-08-20 PROCEDURE — 80048 BASIC METABOLIC PNL TOTAL CA: CPT | Performed by: HOSPITALIST

## 2017-08-20 PROCEDURE — 74011250636 HC RX REV CODE- 250/636: Performed by: INTERNAL MEDICINE

## 2017-08-20 PROCEDURE — 74011000250 HC RX REV CODE- 250: Performed by: INTERNAL MEDICINE

## 2017-08-20 PROCEDURE — 74011250636 HC RX REV CODE- 250/636: Performed by: FAMILY MEDICINE

## 2017-08-20 PROCEDURE — 74011000258 HC RX REV CODE- 258: Performed by: HOSPITALIST

## 2017-08-20 PROCEDURE — 74011250637 HC RX REV CODE- 250/637: Performed by: NURSE PRACTITIONER

## 2017-08-20 PROCEDURE — 74011636637 HC RX REV CODE- 636/637: Performed by: HOSPITALIST

## 2017-08-20 PROCEDURE — 65660000000 HC RM CCU STEPDOWN

## 2017-08-20 RX ADMIN — INSULIN LISPRO 6 UNITS: 100 INJECTION, SOLUTION INTRAVENOUS; SUBCUTANEOUS at 17:17

## 2017-08-20 RX ADMIN — MORPHINE SULFATE 2 MG: 2 INJECTION, SOLUTION INTRAMUSCULAR; INTRAVENOUS at 20:27

## 2017-08-20 RX ADMIN — CLINDAMYCIN PHOSPHATE 600 MG: 150 INJECTION, SOLUTION, CONCENTRATE INTRAVENOUS at 23:02

## 2017-08-20 RX ADMIN — METOPROLOL TARTRATE 100 MG: 50 TABLET ORAL at 09:11

## 2017-08-20 RX ADMIN — INSULIN LISPRO 6 UNITS: 100 INJECTION, SOLUTION INTRAVENOUS; SUBCUTANEOUS at 22:00

## 2017-08-20 RX ADMIN — MORPHINE SULFATE 2 MG: 2 INJECTION, SOLUTION INTRAMUSCULAR; INTRAVENOUS at 23:01

## 2017-08-20 RX ADMIN — MORPHINE SULFATE 4 MG: 2 INJECTION, SOLUTION INTRAMUSCULAR; INTRAVENOUS at 02:00

## 2017-08-20 RX ADMIN — LOSARTAN POTASSIUM 50 MG: 50 TABLET ORAL at 09:11

## 2017-08-20 RX ADMIN — NIFEDIPINE 30 MG: 30 TABLET, FILM COATED, EXTENDED RELEASE ORAL at 09:08

## 2017-08-20 RX ADMIN — HEPARIN SODIUM 5000 UNITS: 5000 INJECTION, SOLUTION INTRAVENOUS; SUBCUTANEOUS at 23:06

## 2017-08-20 RX ADMIN — MORPHINE SULFATE 4 MG: 2 INJECTION, SOLUTION INTRAMUSCULAR; INTRAVENOUS at 09:01

## 2017-08-20 RX ADMIN — ASPIRIN 81 MG CHEWABLE TABLET 81 MG: 81 TABLET CHEWABLE at 09:12

## 2017-08-20 RX ADMIN — CLOPIDOGREL BISULFATE 75 MG: 75 TABLET ORAL at 09:11

## 2017-08-20 RX ADMIN — INSULIN DETEMIR 5 UNITS: 100 INJECTION, SOLUTION SUBCUTANEOUS at 22:00

## 2017-08-20 RX ADMIN — MORPHINE SULFATE 2 MG: 2 INJECTION, SOLUTION INTRAMUSCULAR; INTRAVENOUS at 13:01

## 2017-08-20 RX ADMIN — HEPARIN SODIUM 5000 UNITS: 5000 INJECTION, SOLUTION INTRAVENOUS; SUBCUTANEOUS at 09:07

## 2017-08-20 RX ADMIN — CLINDAMYCIN PHOSPHATE 600 MG: 150 INJECTION, SOLUTION, CONCENTRATE INTRAVENOUS at 00:24

## 2017-08-20 RX ADMIN — HEPARIN SODIUM 5000 UNITS: 5000 INJECTION, SOLUTION INTRAVENOUS; SUBCUTANEOUS at 17:17

## 2017-08-20 RX ADMIN — ATORVASTATIN CALCIUM 80 MG: 40 TABLET, FILM COATED ORAL at 09:10

## 2017-08-20 RX ADMIN — METOPROLOL TARTRATE 100 MG: 50 TABLET ORAL at 17:18

## 2017-08-20 RX ADMIN — INSULIN LISPRO 2 UNITS: 100 INJECTION, SOLUTION INTRAVENOUS; SUBCUTANEOUS at 09:08

## 2017-08-20 RX ADMIN — CLINDAMYCIN PHOSPHATE 600 MG: 150 INJECTION, SOLUTION, CONCENTRATE INTRAVENOUS at 16:14

## 2017-08-20 RX ADMIN — MORPHINE SULFATE 4 MG: 2 INJECTION, SOLUTION INTRAMUSCULAR; INTRAVENOUS at 16:15

## 2017-08-20 NOTE — PROGRESS NOTES
Problem: Falls - Risk of  Goal: *Absence of Falls  Document Don Fall Risk and appropriate interventions in the flowsheet.    Outcome: Progressing Towards Goal  Fall Risk Interventions:  Mobility Interventions: Patient to call before getting OOB           Medication Interventions: Patient to call before getting OOB, Teach patient to arise slowly     Elimination Interventions: Call light in reach     History of Falls Interventions: Door open when patient unattended

## 2017-08-20 NOTE — ROUTINE PROCESS
Bedside and Verbal shift change report given to Ezekiel SPRAGUE (oncoming nurse) by [id number] (offgoing nurse). Report included the following information SBAR, Kardex, Intake/Output, MAR, Recent Results and Cardiac Rhythm SR/ST.     0900 Pt's Clindamycin is not is showing not stocked in Pyxis will follow up with pharmacy. Pt is sleeping. [address] about pt's Clindamycin. Per pharmacy medication will be brought to the floor. Will continue to monitor. 1130 Pt resting in bed. Complaints of pain have been addressed. Reminded pt that she is currently on a clear liquid diet. Pt eating pizza. 1315 Clindamycin still not in Pyxis or in Pyxis refridgerator. Called pharmacy. Per pharmacy med will be made and delivered to floor. 1600 Pt resting in bed. Complaints of pain have been addressed. Will continue to monitor. 1850 Pt resting in bed. Complaints of pain have been addressed. Will continue to monitor. Bedside and Verbal shift change report given to Jovanna NEWELL (oncoming nurse) by   [id number] (offgoing nurse). Report included the following information SBAR, Kardex, Intake/Output, MAR, Recent Results and Cardiac Rhythm SR/ST.

## 2017-08-20 NOTE — ROUTINE PROCESS
1908: Assumed care. Resting quietly. Assessment done Denies any discomfort at this time. Call light within reach. Family at bedside. 2020: Medicated for pain per patient request. Will continue to monitor. 2107: Due meds given. 2246: Due med given. Medicated for pain per patient request. Will continue to monitor. 0029: No change from previous assessment. Due med given. 0200: Medicated for pain per patient request. Will continue to monitor. 1876: Sleeping.  0520: No change from previous assessment. 0645: Slept good thru night. Needs attended. 0720: Bedside and Verbal shift change report given to Emiliana Fletcher RN (oncoming nurse) by Suzanne Marquez RN (offgoing nurse). Report included the following information SBAR, Kardex, Intake/Output, MAR and Recent Results.

## 2017-08-20 NOTE — PROGRESS NOTES
Progress Note      Patient: Silvana Cranker               Sex: female          DOA: 8/17/2017       YOB: 1975      Age:  43 y.o.        LOS:  LOS: 3 days             CHIEF COMPLAINT:  Tonsilitis with Neutropenia    Subjective:     Patient has pain  No SOB    Objective:      Visit Vitals    /72    Pulse 93    Temp 99.4 °F (37.4 °C)    Resp 20    Ht 5' 5\" (1.651 m)    Wt 106.4 kg (234 lb 9.1 oz)    SpO2 94%    BMI 39.03 kg/m2       Physical Exam:  Gen:  No distress, no complaint  Lungs:  Clear bilaterally, no wheeze or rhonchi  Heart:  Regular rate and rhythm, no murmurs or gallops  Abdomen:  Soft, non-tender, normal bowel sounds        Lab/Data Reviewed:  BMP:   Lab Results   Component Value Date/Time     08/20/2017 06:15 AM    K 4.1 08/20/2017 06:15 AM     (H) 08/20/2017 06:15 AM    CO2 19 (L) 08/20/2017 06:15 AM    AGAP 10 08/20/2017 06:15 AM     (H) 08/20/2017 06:15 AM    BUN 18 08/20/2017 06:15 AM    CREA 2.21 (H) 08/20/2017 06:15 AM    GFRAA 30 (L) 08/20/2017 06:15 AM    GFRNA 24 (L) 08/20/2017 06:15 AM         Assessment/Plan     Principal Problem:     Tonsillitis (8/20/2017)    Active Problems:    HIV (human immunodeficiency virus infection) ([facility] [address] (4/30/2013)      Diabetes mellitus type 2, controlled ([facility] [address] (4/30/2013)      Dysphagia (8/17/2017)      Leukopenia (8/17/2017)      MRSA (methicillin resistant staph aureus) culture positive (8/17/2017)      Anemia (8/17/2017)      SAVI (acute kidney injury) ([facility] [address] (8/17/2017)        Plan:  Continue with IV antibiotics  Follow WBC count  Pain control  Follow SOB  BS control

## 2017-08-21 LAB
ANION GAP SERPL CALC-SCNC: 11 MMOL/L (ref 3–18)
BASOPHILS # BLD: 0.1 K/UL (ref 0–0.1)
BASOPHILS NFR BLD: 1 % (ref 0–3)
BUN SERPL-MCNC: 14 MG/DL (ref 7–18)
BUN/CREAT SERPL: 7 (ref 12–20)
CALCIUM SERPL-MCNC: 8.1 MG/DL (ref 8.5–10.1)
CHLORIDE SERPL-SCNC: 107 MMOL/L (ref 100–108)
CO2 SERPL-SCNC: 20 MMOL/L (ref 21–32)
CREAT SERPL-MCNC: 2.14 MG/DL (ref 0.6–1.3)
DIFFERENTIAL METHOD BLD: ABNORMAL
EOSINOPHIL # BLD: 0.5 K/UL (ref 0–0.4)
EOSINOPHIL NFR BLD: 4 % (ref 0–5)
ERYTHROCYTE [DISTWIDTH] IN BLOOD BY AUTOMATED COUNT: 17.1 % (ref 11.6–14.5)
GLUCOSE BLD STRIP.AUTO-MCNC: 142 MG/DL (ref 70–110)
GLUCOSE BLD STRIP.AUTO-MCNC: 180 MG/DL (ref 70–110)
GLUCOSE BLD STRIP.AUTO-MCNC: 228 MG/DL (ref 70–110)
GLUCOSE BLD STRIP.AUTO-MCNC: 253 MG/DL (ref 70–110)
GLUCOSE SERPL-MCNC: 239 MG/DL (ref 74–99)
HCT VFR BLD AUTO: 31 % (ref 35–45)
HGB BLD-MCNC: 10.4 G/DL (ref 12–16)
LYMPHOCYTES # BLD: 4.3 K/UL (ref 0.8–3.5)
LYMPHOCYTES NFR BLD: 38 % (ref 20–51)
MCH RBC QN AUTO: 27.7 PG (ref 24–34)
MCHC RBC AUTO-ENTMCNC: 33.5 G/DL (ref 31–37)
MCV RBC AUTO: 82.7 FL (ref 74–97)
METAMYELOCYTES NFR BLD MANUAL: 3 %
MONOCYTES # BLD: 0.8 K/UL (ref 0–1)
MONOCYTES NFR BLD: 7 % (ref 2–9)
MYELOCYTES NFR BLD MANUAL: 4 %
NEUTS BAND NFR BLD MANUAL: 7 % (ref 0–5)
NEUTS SEG # BLD: 4.1 K/UL (ref 1.8–8)
NEUTS SEG NFR BLD: 36 % (ref 42–75)
PLATELET # BLD AUTO: 442 K/UL (ref 135–420)
PMV BLD AUTO: 10.9 FL (ref 9.2–11.8)
POTASSIUM SERPL-SCNC: 4.2 MMOL/L (ref 3.5–5.5)
RBC # BLD AUTO: 3.75 M/UL (ref 4.2–5.3)
RBC MORPH BLD: ABNORMAL
RBC MORPH BLD: ABNORMAL
SODIUM SERPL-SCNC: 138 MMOL/L (ref 136–145)
WBC # BLD AUTO: 11.3 K/UL (ref 4.6–13.2)
WBC MORPH BLD: ABNORMAL

## 2017-08-21 PROCEDURE — 74011636637 HC RX REV CODE- 636/637: Performed by: HOSPITALIST

## 2017-08-21 PROCEDURE — 36415 COLL VENOUS BLD VENIPUNCTURE: CPT | Performed by: NURSE PRACTITIONER

## 2017-08-21 PROCEDURE — 97530 THERAPEUTIC ACTIVITIES: CPT

## 2017-08-21 PROCEDURE — 74011250637 HC RX REV CODE- 250/637: Performed by: NURSE PRACTITIONER

## 2017-08-21 PROCEDURE — 74011250636 HC RX REV CODE- 250/636: Performed by: INTERNAL MEDICINE

## 2017-08-21 PROCEDURE — 80048 BASIC METABOLIC PNL TOTAL CA: CPT | Performed by: NURSE PRACTITIONER

## 2017-08-21 PROCEDURE — 82962 GLUCOSE BLOOD TEST: CPT

## 2017-08-21 PROCEDURE — 74011000258 HC RX REV CODE- 258: Performed by: HOSPITALIST

## 2017-08-21 PROCEDURE — 65660000000 HC RM CCU STEPDOWN

## 2017-08-21 PROCEDURE — 85025 COMPLETE CBC W/AUTO DIFF WBC: CPT | Performed by: NURSE PRACTITIONER

## 2017-08-21 PROCEDURE — 74011000250 HC RX REV CODE- 250: Performed by: HOSPITALIST

## 2017-08-21 PROCEDURE — 74011250636 HC RX REV CODE- 250/636: Performed by: FAMILY MEDICINE

## 2017-08-21 RX ADMIN — INSULIN LISPRO 6 UNITS: 100 INJECTION, SOLUTION INTRAVENOUS; SUBCUTANEOUS at 10:06

## 2017-08-21 RX ADMIN — MORPHINE SULFATE 4 MG: 2 INJECTION, SOLUTION INTRAMUSCULAR; INTRAVENOUS at 00:48

## 2017-08-21 RX ADMIN — LOSARTAN POTASSIUM 50 MG: 50 TABLET ORAL at 10:07

## 2017-08-21 RX ADMIN — METOPROLOL TARTRATE 100 MG: 50 TABLET ORAL at 10:07

## 2017-08-21 RX ADMIN — MORPHINE SULFATE 2 MG: 2 INJECTION, SOLUTION INTRAMUSCULAR; INTRAVENOUS at 23:57

## 2017-08-21 RX ADMIN — CLINDAMYCIN PHOSPHATE 600 MG: 150 INJECTION, SOLUTION, CONCENTRATE INTRAVENOUS at 11:13

## 2017-08-21 RX ADMIN — ASPIRIN 81 MG CHEWABLE TABLET 81 MG: 81 TABLET CHEWABLE at 10:07

## 2017-08-21 RX ADMIN — CLINDAMYCIN PHOSPHATE 600 MG: 150 INJECTION, SOLUTION, CONCENTRATE INTRAVENOUS at 17:15

## 2017-08-21 RX ADMIN — MORPHINE SULFATE 4 MG: 2 INJECTION, SOLUTION INTRAMUSCULAR; INTRAVENOUS at 17:15

## 2017-08-21 RX ADMIN — METOPROLOL TARTRATE 100 MG: 50 TABLET ORAL at 17:15

## 2017-08-21 RX ADMIN — INSULIN DETEMIR 10 UNITS: 100 INJECTION, SOLUTION SUBCUTANEOUS at 12:33

## 2017-08-21 RX ADMIN — INSULIN LISPRO 3 UNITS: 100 INJECTION, SOLUTION INTRAVENOUS; SUBCUTANEOUS at 23:59

## 2017-08-21 RX ADMIN — CLOPIDOGREL BISULFATE 75 MG: 75 TABLET ORAL at 10:07

## 2017-08-21 RX ADMIN — INSULIN LISPRO 6 UNITS: 100 INJECTION, SOLUTION INTRAVENOUS; SUBCUTANEOUS at 12:33

## 2017-08-21 RX ADMIN — INSULIN DETEMIR 10 UNITS: 100 INJECTION, SOLUTION SUBCUTANEOUS at 21:54

## 2017-08-21 RX ADMIN — MORPHINE SULFATE 4 MG: 2 INJECTION, SOLUTION INTRAMUSCULAR; INTRAVENOUS at 02:57

## 2017-08-21 RX ADMIN — ATORVASTATIN CALCIUM 80 MG: 40 TABLET, FILM COATED ORAL at 10:07

## 2017-08-21 RX ADMIN — MORPHINE SULFATE 4 MG: 2 INJECTION, SOLUTION INTRAMUSCULAR; INTRAVENOUS at 12:35

## 2017-08-21 RX ADMIN — MORPHINE SULFATE 4 MG: 2 INJECTION, SOLUTION INTRAMUSCULAR; INTRAVENOUS at 10:05

## 2017-08-21 RX ADMIN — NIFEDIPINE 30 MG: 30 TABLET, FILM COATED, EXTENDED RELEASE ORAL at 10:07

## 2017-08-21 RX ADMIN — MORPHINE SULFATE 4 MG: 2 INJECTION, SOLUTION INTRAMUSCULAR; INTRAVENOUS at 05:36

## 2017-08-21 RX ADMIN — MORPHINE SULFATE 2 MG: 2 INJECTION, SOLUTION INTRAMUSCULAR; INTRAVENOUS at 21:04

## 2017-08-21 NOTE — PROGRESS NOTES
Assisted Primary RN, gave pain medication. Gave max dose of 4mg Morphine twice, pt requesting pain medication every 2 hours. States the pain medication doesn't really help and hurts to swallow her spit.

## 2017-08-21 NOTE — PROGRESS NOTES
Chart reviewed. Plan remains home with home health when medically stable, will need order, thanks. Will cont to follow for further needs. Julianne Puga RN,ext. [phone].

## 2017-08-21 NOTE — PROGRESS NOTES
Infectious Disease Follow-up     Admit Date: 8/17/2017       Current abx Prior abx    Clindamycin 8/18 - 3, Abx 4 of 10 Teflaro x 4 weeks since 7/19, levoflox/ zosyn 8/17 x 1, Zyvox 8/17 - 1       ASSESSMENT: -- RECOMMENDATIONS       Fever- high grade in immunocompromised host  - still spiking high grade and WBC remains low  - Blcx and ucx neg and throat cx neg for strep screen - Fever resolved after removal of PICC though cath tip cx neg and no bacteremia     Rt sided tonsillitis without abscess   - Infectious Mononucleosis (EBV reactivation by serology but denies prior episode) - no GAStrep,   - on exam rt swollen tonsils with white patches on it, cervical LN +, +odynophagia. No thrush. - CT neck with tonsillitis, no abscess   - Strep screen No GAS,   -  EBV VCAS IgM neg, EA IgG +, VCA IgG +, NA IgG+ (Reactivation >> Past Infection) but Mono screen negative ->on Clindamycin (empiric coverage until better)  -> Throat cx with some BHS  -> complete total 10 days of Abx   -> continues to c/o pain in throat- symptomatic rx per IM   Leukopenia with eosinophilia  - known chronic leukocytosis now with neutropenia /leukopenia due EBV infection  - also had 30% eosinophilia ?  Drug reaction to Teflaro  - monitor for now   Recent MRSA BSI from rt buttocks abscess at [facility]  - admitted 7/16-8/2, blcx 7/16 2/2 positive, repeat 7/19 neg   - s/p I and D   - PICC 7/24 placed, sent out on Teflaro for total 4 weeks  - neg Blcx here   - PICC out 8/19   HIV since 1999  - Follows [facility]   - last CD4 607/ 19.6%, VL 5660 [ 6/28/17 ]  - ART - Tenofovir 25mg daily, emtricitabine 200mg q 72hr, raltegravir 400mg bid   - h/o non compliance, currently also non compliant , says did not get prescription of tenofovir, emtricitabine since discharge [ issue with pharmacy ], hence not taking any ART since discharge. -> hold ART since not taking anyway, will ask to resume when she has all meds at home  - f/up [facility] after discharge [ has appointment on 8/22 ]   CKD stage 3  - baseline cr around 3      DM type 2 uncontrolled - hba1c 9.8%     CAD s/p CABG x 4 in 2015     Comorbid :- HTN, TIA, HLP        MICROBIOLOGY:   [ Rohan Jacobson 92 7/16 Blcx - 2/2 MRSA, 7/19 Blcx x 2 NTD, TTE neg ]      8/17  Blcx x 2 - ntd           Ucx - no growth   Throat cx neg strep but cx with few BHS     LINES/DRAINS:   Rt chest PICC 7/24 at [facility] Problems    Diagnosis Date Noted    Tonsillitis 08/20/2017    Leukopenia 08/17/2017    Anemia 08/17/2017    Dysphagia 08/17/2017    SAVI (acute kidney injury) ([facility] [address] 08/17/2017    MRSA (methicillin resistant staph aureus) culture positive 08/17/2017    HIV (human immunodeficiency virus infection) ([facility] [address] 04/30/2013    Diabetes mellitus type 2, controlled ([facility] 04/30/2013         Subjective: Interval notes reviewed. Afebrile now. Sleepy and no distress but c/o pain and asking for more pain meds. Says on clear liq as can't eat much. D/w her that cx neg for pICC tip but with BHS in throat and will need 10 days of Abx.        Current Facility-Administered Medications   Medication Dose Route Frequency    clindamycin phosphate (CLEOCIN) 600 mg in 0.9% sodium chloride (MBP/ADV) 100 mL ADV  600 mg IntraVENous Q8H    insulin detemir (LEVEMIR) injection 5 Units  5 Units SubCUTAneous QHS    aspirin chewable tablet 81 mg  81 mg Oral DAILY    atorvastatin (LIPITOR) tablet 80 mg  80 mg Oral DAILY    losartan (COZAAR) tablet 50 mg  50 mg Oral DAILY    metoprolol tartrate (LOPRESSOR) tablet 100 mg  100 mg Oral BID    NIFEdipine ER (PROCARDIA XL) tablet 30 mg  30 mg Oral DAILY    clopidogrel (PLAVIX) tablet 75 mg  75 mg Oral DAILY    acetaminophen (TYLENOL) tablet 650 mg  650 mg Oral Q6H PRN    morphine injection 1-4 mg  1-4 mg IntraVENous Q2H PRN    lidocaine (XYLOCAINE) 2 % viscous solution 5 mL  5 mL Mouth/Throat TID PRN    insulin lispro (HUMALOG) injection   SubCUTAneous AC&HS    glucose chewable tablet 16 g  4 Tab Oral PRN    glucagon (GLUCAGEN) injection 1 mg  1 mg IntraMUSCular PRN    dextrose (D50W) injection syrg 12.5-25 g  25-50 mL IntraVENous PRN    heparin (porcine) injection 5,000 Units  5,000 Units SubCUTAneous Q8H    acetaminophen (TYLENOL) solution 650 mg  650 mg Oral Q4H PRN    acetaminophen (TYLENOL) suppository 650 mg  650 mg Rectal Q4H PRN         Objective:     Visit Vitals    /76 (BP 1 Location: Left arm, BP Patient Position: At rest)    Pulse 94    Temp 99.7 °F (37.6 °C)    Resp 18    Ht 5' 5\" (1.651 m)    Wt 106.7 kg (235 lb 3.7 oz)    SpO2 96%    BMI 39.14 kg/m2       Temp (24hrs), Av.3 °F (37.4 °C), Min:99 °F (37.2 °C), Max:99.7 °F (37.6 °C)      General: Well developed, obese 43 y.o. [place] female in no distress   ENT: ENT exam normal, no neck nodes or sinus tenderness  Mouth:  could not visualize tonsils- large tongue  Neck: rt sided jugular lymphadenopathy   Cardio:  regular rate and rhythm, S1, S2 normal, no murmur  Chest: mild line scar from previous CABG, rt chest PICC intact - has come out 1-2 cm but no drainage or erythema  Lungs: clear to auscultation, no wheezes or rales and unlabored breathing  Abdomen: soft, non-tender. Bowel sounds normal. No masses, no organomegaly.   Extremities:  no redness or tenderness in the calves or thighs, no edema  Neuro: Grossly normal      Labs: Results:   Chemistry Recent Labs      17   0615  17   0925  17   0410   GLU  154*  176*  121*   NA  139  135*  136   K  4.1  4.6  4.4   CL  110*  109*  108   CO2  19*  17*  19*   BUN  18  18  20*   CREA  2.21*  2.27*  2.19*   CA  7.3*  7.3*  7.5*   AGAP  10  9  9   BUCR  8*  8*  9*      CBC w/Diff Recent Labs      17   0925  17   0410   WBC  3.5*  3.9*   RBC  3.98*  3.70*   HGB  10.8*  10.3*   HCT  32.8*  30.7*   PLT  345  328   GRANS  14*  2*   LYMPH  52  65*   EOS  8*  7*        2017 12:38 PM - Eugenio, Lab In [facility]       Component Results      Component Value Flag Ref Range Units Status     EBV Ab VCA, IgM <36.0  0.0 - 35.9 U/mL Final     Comment:     (NOTE)                                   Negative        <36.0                                   Equivocal 36.0 - 43.9                                   Positive        >43.9        EBV Early Antigen Ab, IgG >150.0 (H) 0.0 - 8.9 U/mL Final     Comment:     (NOTE)                                   Negative        < 9.0                                   Equivocal  9.0 - 10.9                                   Positive        >10.9        EBV Ab VCA, IgG >600.0 (H) 0.0 - 17.9 U/mL Final     Comment:     (NOTE)                                   Negative        <18.0                                   Equivocal 18.0 - 21. 9                                   Positive        >21.9        EBV Nuclear Antigen Ab, IgG >600.0 (H) 0.0 - 17.9 U/mL Final     Comment:     (NOTE)                                   Negative        <18.0                                   Equivocal 18.0 - 21. 9                                   Positive        >21.9          Microbiology Results  Recent Labs      08/19/17   1415   CULT  NO GROWTH AFTER 15 HOURS       Maikel Pond MD  August 21, 2017  [facility]  [phone]

## 2017-08-21 NOTE — PROGRESS NOTES
conducted a Follow up consultation and Spiritual Assessment for [facility], who is a 43 y.o.,female. The  provided the following Interventions:  Continued the relationship of care and support. Listened empathically. Offered prayer and assurance of continued prayer on patients behalf. Chart reviewed. The following outcomes were achieved:  Patient expressed gratitude for 's visit. Assessment:  There are no spiritual or Anabaptist issues which require intervention at this time. Plan:  Chaplains will continue to follow and will provide pastoral care on an as needed/requested basis.  recommends bedside caregivers page  on duty if patient shows signs of acute spiritual or emotional distress. Ora Johnson M.Div.   [facility]  [phone]

## 2017-08-21 NOTE — PROGRESS NOTES
Problem: Mobility Impaired (Adult and Pediatric)  Goal: *Acute Goals and Plan of Care (Insert Text)  Physical Therapy Goals  Initiated 8/19/2017 and to be accomplished within 7 day(s)  1. Patient will move from supine to sit and sit to supine , scoot up and down and roll side to side in bed with modified independence. 2. Patient will transfer from bed to chair and chair to bed with modified independence using the least restrictive device. 3. Patient will perform sit to stand with modified independence. 4. Patient will ambulate with modified independence for >/= 150 feet with the least restrictive device. 5. Patient will ascend/descend 3 stairs with no handrail(s) with modified independence. PHYSICAL THERAPY TREATMENT     Patient: Mehran Kraft (43 y.o. female)  Date: 8/21/2017  Diagnosis: Hyperglycemia  Hyponatremia  Anemia  SAVI (acute kidney injury) ([facility]  Dehydration  HIV (human immunodeficiency virus infection) ([facility]  Dysphagia  Leukopenia  Elevated troponin  MRSA (methicillin resistant staph aureus) culture positive  Elevated troponin  SAVI (acute kidney injury) ([facility]  Dehydration  Hyponatremia  Anemia  Dysphagia  HIV (human immunodeficiency virus infection) (HCC)  Leukopenia  Hyperglycemia Tonsillitis  Precautions:    Chart, physical therapy assessment, plan of care and goals were reviewed. ASSESSMENT:  Pt agreed to participate in therapy session this pm and ambulated 30ft CGA in room with HHA, demonstrating widened SHE, increase trunk sway, and min instability noted but no LOB. Gait distance limited in room due to contact precaution. EDUCATION: Pt education on safety techniques and importance of increase participation in OOB activities to maximize overall strength and functional mobility with performing upright activities. Pt needs max reinforcement.      Progression toward goals:        Improving appropriately and progressing toward goals  X    Improving slowly and progressing toward goals Not making progress toward goals and plan of care will be adjusted       PLAN:  Patient continues to benefit from skilled intervention to address the above impairments. Continue treatment per established plan of care. Discharge Recommendations:  Home Health  Further Equipment Recommendations for Discharge: Rolling walker, cane       SUBJECTIVE:   Patient stated I can get up\". OBJECTIVE DATA SUMMARY:   Critical Behavior:  Neurologic State: Alert  Orientation Level: Oriented X4  Cognition: Appropriate for age attention/concentration  Safety/Judgement: Awareness of environment, Fall prevention     G CODE:  Functional Mobility Training:  Bed Mobility:  Rolling: Supervision  Supine to Sit: Stand-by asssistance  Sit to Supine: Stand-by asssistance  Transfers:  Sit to Stand: Contact guard assistance  Stand to Sit: Contact guard assistance  Balance:  Sitting: With support  Sitting - Static: Fair (occasional)  Sitting - Dynamic: Fair (occasional)  Standing: With support  Standing - Static: Fair  Standing - Dynamic : Poor ((+); without AD)  Ambulation/Gait Training:  Distance (ft): 30 Feet (ft)  Assistive Device: Gait belt; Other (comment) (HHA)  Ambulation - Level of Assistance: Contact guard assistance  Gait Abnormalities: Decreased step clearance; Path deviations;Trunk sway increased  Base of Support: Center of gravity altered  Speed/Emmie: Fluctuations; Pace decreased (<100 feet/min)  Step Length: Left shortened;Right shortened  Neuro Re-Education:  Therapeutic Exercises:   Seated TE AROM B LE's: ankle pumps, LAQ's, and hip flexion marches x10 reps  Vital Signs  Temp: 99.6 °F (37.6 °C)     Pulse (Heart Rate): 77     BP: 140/70     Resp Rate: 16     O2 Sat (%): 94 %  Pain:  Pre treatment pain level: 0  Post treatment pain level: 0  Pain Scale 1: Numeric (0 - 10)  Pain Intensity 1: 0  Activity Tolerance:   Fair     After treatment:   Patient left in no apparent distress sitting up in chair  Patient left in no apparent distress in bed X  Call bell left within reach X  Nursing notified  Caregiver present  Bed alarm activated      Victor M Hernandez PTA   Time Calculation: 14 mins

## 2017-08-21 NOTE — PROGRESS NOTES
Problem: Falls - Risk of  Goal: *Absence of Falls  Document Don Fall Risk and appropriate interventions in the flowsheet.    Outcome: Progressing Towards Goal  Fall Risk Interventions:  Mobility Interventions: Patient to call before getting OOB, Bed/chair exit alarm           Medication Interventions: Bed/chair exit alarm, Teach patient to arise slowly, Patient to call before getting OOB     Elimination Interventions: Patient to call for help with toileting needs     History of Falls Interventions: Door open when patient unattended

## 2017-08-21 NOTE — ROUTINE PROCESS
Bedside, Verbal and Written shift change report given to Renetta Garcia RN (oncoming nurse) by Brittni Candelaria RN (offgoing nurse). Report included the following information SBAR, Kardex, Intake/Output, MAR, Recent Results, Med Rec Status, Procedure Summary and Cardiac Rhythm NSR/ST.      0710 - Shift assessment completed. Pt alert and oriented x4. No respiratory distress noted. No c/o pain reported. Call bell within reach, bed in low position. Will continue to monitor.      1005 - Pt c/o pain to right jaw and neck, PRN Morphine administered. 1008 - Pt refusing Heparin at this time. Pt isn't sure if she has started her period or if there's blood in her urine. Asked pt to let me see urine next time she voids. Will notify Dr Carrie Hines.    [place] Giacomo re-assessment completed, no change in pt condition.      1235 - Pt c/o pain to right jaw and neck, PRN Morphine administered. 1500 - Pt refusing Heparin at this time. 1610 - Shift re-assessment completed, no change in pt condition.      1715 - Pt c/o pain to right jaw and neck, PRN Morphine administered. Bedside, Verbal and Written shift change report given to Axel Lopez RN (oncoming nurse) by Renetta Garcia RN (offgoing nurse). Report included the following information SBAR, Kardex, Intake/Output, MAR, Recent Results, Med Rec Status, Procedure Summary and Cardiac Rhythm NSR/ST.

## 2017-08-21 NOTE — PROGRESS NOTES
2 [facility]  Hospitalist Division        Inpatient Daily Progress Note    Daily progress Note    Patient: Ani Coyle MRN: [id number]  CSN: [id number]    YOB: 1975  Age: 43 y.o. Sex: female    DOA: 8/17/2017 LOS:  LOS: 4 days                    Chief Complaint:  Neck pain       Subjective:      Continues to c/o sore throat. Denies fever, chills, chest pain or shortness of breath. In no distress. Objective:      Visit Vitals    /79    Pulse 94    Temp 99.4 °F (37.4 °C)    Resp 16    Ht 5' 5\" (1.651 m)    Wt 106.7 kg (235 lb 3.7 oz)    SpO2 97%    BMI 39.14 kg/m2         Physical Exam:  General appearance: alert, cooperative, no distress, appears stated age  HEENT: cervical lymphadnopathy   Lungs: bases diminished bilaterally 2/2 body habitus, no wheezes or rhonchi   Heart: regular rate and rhythm, S1, S2 normal, no murmur, click, rub or gallop  Abdomen: soft, non tender, non distended. Bowel sounds normoactive   Extremities: extremities normal, atraumatic, no cyanosis. Trace BLE edema   Skin: Skin color, texture, turgor normal. No rashes or lesions  Neurologic: Grossly normal  PSY: appropriately behaved        Intake and Output:  Current Shift:     Last three shifts:  08/19 1901 - 08/21 0700  In: 1520 [P.O.:1320;  I.V.:200]  Out: 1920 [Urine:1920]    Recent Results (from the past 24 hour(s))   GLUCOSE, POC    Collection Time: 08/20/17 11:50 AM   Result Value Ref Range    Glucose (POC) 137 (H) 70 - 110 mg/dL   GLUCOSE, POC    Collection Time: 08/20/17  4:06 PM   Result Value Ref Range    Glucose (POC) 272 (H) 70 - 110 mg/dL   GLUCOSE, POC    Collection Time: 08/20/17  8:58 PM   Result Value Ref Range    Glucose (POC) 275 (H) 70 - 110 mg/dL   GLUCOSE, POC    Collection Time: 08/21/17  7:39 AM   Result Value Ref Range    Glucose (POC) 253 (H) 70 - 110 mg/dL           Lab Results   Component Value Date/Time    Glucose 154 08/20/2017 06:15 AM Glucose 176 08/19/2017 09:25 AM    Glucose 121 08/19/2017 04:10 AM    Glucose 66 08/18/2017 03:50 AM    Glucose 71 08/17/2017 08:36 PM        Assessment/Plan:     Patient Active Problem List   Diagnosis Code    HIV (human immunodeficiency virus infection) ([facility] Z21    Diabetes mellitus type 2, controlled ([facility] E11.9    Depressed F32.9    HTN (hypertension) I10    Pure hypercholesterolemia E78.00    CAD (coronary artery disease) I25.10    Stroke (HCC) I63.9    Migraine G43.909    Leukopenia D72.819    Anemia D64.9    Dysphagia R13.10    SAVI (acute kidney injury) ([facility] N17.9    MRSA (methicillin resistant staph aureus) culture positive Z22.322    Tonsillitis J03.90       A/P:  Tonsillitis without abscess: ID following. Continue Clindamycon    MRSA bacteremia 2/2 to Right buttock abscess: Antibiotics as above  HIV: history of non compliance with HAART. Continue to hold HAART   HTN: stable. Metoprolol, Losartan, Nifedipine ER  CAD: ASA  DM: SSI.  Monitor glucose control  CKD: Serum Cr stable   Hx CVA: Lipitor, Plavix   Morbid obesity; BMI 39.3   DVT prophylaxis: Heparin         Erick Rogers, DANAETIFFANIE [address]ley [address]  Pager:  [phone]  Office:  [phone]

## 2017-08-22 ENCOUNTER — HOME HEALTH ADMISSION (OUTPATIENT)
Dept: HOME HEALTH SERVICES | Facility: HOME HEALTH | Age: 42
End: 2017-08-22

## 2017-08-22 VITALS
HEART RATE: 80 BPM | TEMPERATURE: 98.5 F | RESPIRATION RATE: 20 BRPM | SYSTOLIC BLOOD PRESSURE: 110 MMHG | HEIGHT: 65 IN | DIASTOLIC BLOOD PRESSURE: 57 MMHG | OXYGEN SATURATION: 93 % | BODY MASS INDEX: 39.15 KG/M2 | WEIGHT: 235 LBS

## 2017-08-22 LAB
ANION GAP SERPL CALC-SCNC: 9 MMOL/L (ref 3–18)
BACTERIA SPEC CULT: NORMAL
BASOPHILS # BLD: 0 K/UL (ref 0–0.1)
BASOPHILS NFR BLD: 0 % (ref 0–3)
BUN SERPL-MCNC: 11 MG/DL (ref 7–18)
BUN/CREAT SERPL: 6 (ref 12–20)
CALCIUM SERPL-MCNC: 8.2 MG/DL (ref 8.5–10.1)
CHLORIDE SERPL-SCNC: 111 MMOL/L (ref 100–108)
CO2 SERPL-SCNC: 20 MMOL/L (ref 21–32)
CREAT SERPL-MCNC: 1.93 MG/DL (ref 0.6–1.3)
DIFFERENTIAL METHOD BLD: ABNORMAL
EOSINOPHIL # BLD: 0.2 K/UL (ref 0–0.4)
EOSINOPHIL NFR BLD: 2 % (ref 0–5)
ERYTHROCYTE [DISTWIDTH] IN BLOOD BY AUTOMATED COUNT: 17.1 % (ref 11.6–14.5)
GLUCOSE BLD STRIP.AUTO-MCNC: 209 MG/DL (ref 70–110)
GLUCOSE BLD STRIP.AUTO-MCNC: 326 MG/DL (ref 70–110)
GLUCOSE BLD STRIP.AUTO-MCNC: 97 MG/DL (ref 70–110)
GLUCOSE SERPL-MCNC: 102 MG/DL (ref 74–99)
HBV SURFACE AG SER QL: <0.1 INDEX
HBV SURFACE AG SER QL: NEGATIVE
HCT VFR BLD AUTO: 30.3 % (ref 35–45)
HCV AB SER IA-ACNC: 0.11 INDEX
HCV AB SERPL QL IA: NEGATIVE
HCV COMMENT,HCGAC: NORMAL
HGB BLD-MCNC: 10.1 G/DL (ref 12–16)
HIV 1+2 AB+HIV1 P24 AG SERPL QL IA: ABNORMAL
HIV12 RESULT COMMENT, HHIVC: ABNORMAL
LYMPHOCYTES # BLD: 3.9 K/UL (ref 0.8–3.5)
LYMPHOCYTES NFR BLD: 34 % (ref 20–51)
MCH RBC QN AUTO: 27.2 PG (ref 24–34)
MCHC RBC AUTO-ENTMCNC: 33.3 G/DL (ref 31–37)
MCV RBC AUTO: 81.7 FL (ref 74–97)
MONOCYTES # BLD: 1.2 K/UL (ref 0–1)
MONOCYTES NFR BLD: 10 % (ref 2–9)
NEUTS BAND NFR BLD MANUAL: 3 % (ref 0–5)
NEUTS SEG # BLD: 5.9 K/UL (ref 1.8–8)
NEUTS SEG NFR BLD: 51 % (ref 42–75)
PLATELET # BLD AUTO: 410 K/UL (ref 135–420)
PMV BLD AUTO: 10.5 FL (ref 9.2–11.8)
POTASSIUM SERPL-SCNC: 4 MMOL/L (ref 3.5–5.5)
RBC # BLD AUTO: 3.71 M/UL (ref 4.2–5.3)
RBC MORPH BLD: ABNORMAL
RBC MORPH BLD: ABNORMAL
SERVICE CMNT-IMP: NORMAL
SODIUM SERPL-SCNC: 140 MMOL/L (ref 136–145)
WBC # BLD AUTO: 11.6 K/UL (ref 4.6–13.2)

## 2017-08-22 PROCEDURE — 87340 HEPATITIS B SURFACE AG IA: CPT | Performed by: HOSPITALIST

## 2017-08-22 PROCEDURE — 85025 COMPLETE CBC W/AUTO DIFF WBC: CPT | Performed by: NURSE PRACTITIONER

## 2017-08-22 PROCEDURE — 74011000250 HC RX REV CODE- 250: Performed by: HOSPITALIST

## 2017-08-22 PROCEDURE — 86803 HEPATITIS C AB TEST: CPT | Performed by: HOSPITALIST

## 2017-08-22 PROCEDURE — 86703 HIV-1/HIV-2 1 RESULT ANTBDY: CPT | Performed by: HOSPITALIST

## 2017-08-22 PROCEDURE — 74011636637 HC RX REV CODE- 636/637: Performed by: HOSPITALIST

## 2017-08-22 PROCEDURE — 86701 HIV-1ANTIBODY: CPT | Performed by: HOSPITALIST

## 2017-08-22 PROCEDURE — 74011250637 HC RX REV CODE- 250/637: Performed by: NURSE PRACTITIONER

## 2017-08-22 PROCEDURE — 80048 BASIC METABOLIC PNL TOTAL CA: CPT | Performed by: NURSE PRACTITIONER

## 2017-08-22 PROCEDURE — 74011250636 HC RX REV CODE- 250/636: Performed by: NURSE PRACTITIONER

## 2017-08-22 PROCEDURE — 74011250637 HC RX REV CODE- 250/637: Performed by: HOSPITALIST

## 2017-08-22 PROCEDURE — 77030011256 HC DRSG MEPILEX <16IN NO BORD MOLN -A

## 2017-08-22 PROCEDURE — 87389 HIV-1 AG W/HIV-1&-2 AB AG IA: CPT | Performed by: HOSPITALIST

## 2017-08-22 PROCEDURE — 74011000258 HC RX REV CODE- 258: Performed by: HOSPITALIST

## 2017-08-22 PROCEDURE — 82962 GLUCOSE BLOOD TEST: CPT

## 2017-08-22 PROCEDURE — 74011250636 HC RX REV CODE- 250/636: Performed by: FAMILY MEDICINE

## 2017-08-22 PROCEDURE — 74011250636 HC RX REV CODE- 250/636: Performed by: INTERNAL MEDICINE

## 2017-08-22 PROCEDURE — 36415 COLL VENOUS BLD VENIPUNCTURE: CPT | Performed by: NURSE PRACTITIONER

## 2017-08-22 RX ORDER — NIFEDIPINE 30 MG/1
30 TABLET, EXTENDED RELEASE ORAL DAILY
Qty: 30 TAB | Refills: 0 | Status: SHIPPED | OUTPATIENT
Start: 2017-08-22

## 2017-08-22 RX ORDER — OXYCODONE AND ACETAMINOPHEN 5; 325 MG/1; MG/1
1-2 TABLET ORAL
Qty: 12 TAB | Refills: 0 | Status: SHIPPED | OUTPATIENT
Start: 2017-08-22 | End: 2019-05-23 | Stop reason: CLARIF

## 2017-08-22 RX ORDER — LOSARTAN POTASSIUM 50 MG/1
50 TABLET ORAL DAILY
Qty: 30 TAB | Refills: 0 | Status: SHIPPED | OUTPATIENT
Start: 2017-08-22

## 2017-08-22 RX ORDER — DIPHENHYDRAMINE HCL 25 MG
25 CAPSULE ORAL
Status: DISCONTINUED | OUTPATIENT
Start: 2017-08-22 | End: 2017-08-22 | Stop reason: HOSPADM

## 2017-08-22 RX ORDER — CLINDAMYCIN HYDROCHLORIDE 300 MG/1
300 CAPSULE ORAL 3 TIMES DAILY
Qty: 24 CAP | Refills: 0 | Status: SHIPPED | OUTPATIENT
Start: 2017-08-22

## 2017-08-22 RX ORDER — OXYCODONE AND ACETAMINOPHEN 5; 325 MG/1; MG/1
1-2 TABLET ORAL
Status: DISCONTINUED | OUTPATIENT
Start: 2017-08-22 | End: 2017-08-22 | Stop reason: HOSPADM

## 2017-08-22 RX ORDER — ATORVASTATIN CALCIUM 80 MG/1
80 TABLET, FILM COATED ORAL DAILY
Qty: 30 TAB | Refills: 0 | Status: SHIPPED | OUTPATIENT
Start: 2017-08-22

## 2017-08-22 RX ORDER — METHYLPREDNISOLONE 4 MG/1
TABLET ORAL
Status: DISCONTINUED | OUTPATIENT
Start: 2017-08-22 | End: 2017-08-22 | Stop reason: HOSPADM

## 2017-08-22 RX ORDER — CLOPIDOGREL BISULFATE 75 MG/1
75 TABLET ORAL DAILY
Qty: 30 TAB | Refills: 0 | Status: SHIPPED | OUTPATIENT
Start: 2017-08-22

## 2017-08-22 RX ORDER — METHYLPREDNISOLONE 4 MG/1
TABLET ORAL
Qty: 1 DOSE PACK | Refills: 0 | Status: SHIPPED | OUTPATIENT
Start: 2017-08-22 | End: 2019-05-23 | Stop reason: CLARIF

## 2017-08-22 RX ORDER — METOPROLOL TARTRATE 100 MG/1
100 TABLET ORAL 2 TIMES DAILY
Qty: 60 TAB | Refills: 0 | Status: SHIPPED | OUTPATIENT
Start: 2017-08-22

## 2017-08-22 RX ORDER — EMTRICITABINE AND TENOFOVIR DISOPROXIL FUMARATE 200; 300 MG/1; MG/1
1 TABLET, FILM COATED ORAL DAILY
Qty: 30 TAB | Refills: 0 | Status: SHIPPED | OUTPATIENT
Start: 2017-08-22

## 2017-08-22 RX ORDER — LIDOCAINE HYDROCHLORIDE 20 MG/ML
5 SOLUTION OROPHARYNGEAL
Qty: 1 BOTTLE | Refills: 0 | Status: SHIPPED | OUTPATIENT
Start: 2017-08-22 | End: 2019-05-23 | Stop reason: CLARIF

## 2017-08-22 RX ADMIN — INSULIN DETEMIR 10 UNITS: 100 INJECTION, SOLUTION SUBCUTANEOUS at 10:20

## 2017-08-22 RX ADMIN — METHYLPREDNISOLONE 4 MG: 4 TABLET ORAL at 17:48

## 2017-08-22 RX ADMIN — ATORVASTATIN CALCIUM 80 MG: 40 TABLET, FILM COATED ORAL at 10:20

## 2017-08-22 RX ADMIN — HEPARIN SODIUM 5000 UNITS: 5000 INJECTION, SOLUTION INTRAVENOUS; SUBCUTANEOUS at 06:08

## 2017-08-22 RX ADMIN — INSULIN LISPRO 12 UNITS: 100 INJECTION, SOLUTION INTRAVENOUS; SUBCUTANEOUS at 16:30

## 2017-08-22 RX ADMIN — CLOPIDOGREL BISULFATE 75 MG: 75 TABLET ORAL at 10:20

## 2017-08-22 RX ADMIN — LOSARTAN POTASSIUM 50 MG: 50 TABLET ORAL at 10:20

## 2017-08-22 RX ADMIN — CLINDAMYCIN PHOSPHATE 600 MG: 150 INJECTION, SOLUTION, CONCENTRATE INTRAVENOUS at 00:02

## 2017-08-22 RX ADMIN — INSULIN LISPRO 6 UNITS: 100 INJECTION, SOLUTION INTRAVENOUS; SUBCUTANEOUS at 12:52

## 2017-08-22 RX ADMIN — MORPHINE SULFATE 2 MG: 2 INJECTION, SOLUTION INTRAMUSCULAR; INTRAVENOUS at 02:14

## 2017-08-22 RX ADMIN — HEPARIN SODIUM 5000 UNITS: 5000 INJECTION, SOLUTION INTRAVENOUS; SUBCUTANEOUS at 00:00

## 2017-08-22 RX ADMIN — ASPIRIN 81 MG CHEWABLE TABLET 81 MG: 81 TABLET CHEWABLE at 10:20

## 2017-08-22 RX ADMIN — MORPHINE SULFATE 2 MG: 2 INJECTION, SOLUTION INTRAMUSCULAR; INTRAVENOUS at 01:47

## 2017-08-22 RX ADMIN — OXYCODONE HYDROCHLORIDE AND ACETAMINOPHEN 2 TABLET: 5; 325 TABLET ORAL at 10:20

## 2017-08-22 RX ADMIN — METOPROLOL TARTRATE 100 MG: 50 TABLET ORAL at 10:20

## 2017-08-22 RX ADMIN — CLINDAMYCIN PHOSPHATE 600 MG: 150 INJECTION, SOLUTION, CONCENTRATE INTRAVENOUS at 10:20

## 2017-08-22 RX ADMIN — METOPROLOL TARTRATE 100 MG: 50 TABLET ORAL at 17:48

## 2017-08-22 RX ADMIN — METHYLPREDNISOLONE 12 MG: 4 TABLET ORAL at 12:51

## 2017-08-22 RX ADMIN — NIFEDIPINE 30 MG: 30 TABLET, FILM COATED, EXTENDED RELEASE ORAL at 10:20

## 2017-08-22 RX ADMIN — DIPHENHYDRAMINE HYDROCHLORIDE 25 MG: 25 CAPSULE ORAL at 01:46

## 2017-08-22 NOTE — ROUTINE PROCESS
1166 Responded to RRT. Patient found kneeling in the bathroom floor, alert and oriented. Floor is wet (looks like water and blood) and patient not wearing non-skid socks. Patient verbalized she's on her period. Denies any pain.

## 2017-08-22 NOTE — PROGRESS NOTES
Shift progress Note:  Assumed care of patient in bed awake and quiet. Medicated x3 for pain. Dressing change to butocks done. Call bell available, uneventful night.   Patient Vitals for the past 12 hrs:   Temp Pulse Resp BP SpO2   08/22/17 0534 99 °F (37.2 °C) 80 16 188/86 96 %   08/22/17 0059 99 °F (37.2 °C) 89 16 154/76 96 %   08/21/17 2321 99.1 °F (37.3 °C) 82 16 142/76 95 %

## 2017-08-22 NOTE — ROUTINE PROCESS
Bedside and Verbal shift change report given to [address] [place] [address] (oncoming nurse) by Joya Sullivan RN   (offgoing nurse). Report included the following information SBAR, Kardex, MAR and Recent Results.

## 2017-08-22 NOTE — PROGRESS NOTES
Infectious Disease Follow-up     Admit Date: 8/17/2017       Current abx Prior abx    Clindamycin 8/18 - 4, Abx 5 of 10 Teflaro x 4 weeks since 7/19, levoflox/ zosyn 8/17 x 1, Zyvox 8/17 - 1       ASSESSMENT: -- RECOMMENDATIONS       Fever- high grade in immunocompromised host  - still spiking high grade and WBC remains low  - Blcx and ucx neg and throat cx neg for strep screen - Fever resolved after removal of PICC though cath tip cx neg and no bacteremia     Rt sided tonsillitis without abscess   - Infectious Mononucleosis (EBV reactivation by serology but denies prior episode) - no GAStrep,   - on exam rt swollen tonsils with white patches on it, cervical LN +, +odynophagia. No thrush. - CT neck with tonsillitis, no abscess   - Strep screen No GAS,   -  EBV VCAS IgM neg, EA IgG +, VCA IgG +, NA IgG+ (Reactivation >> Past Infection) but Mono screen negative -> on Clindamycin   -> Throat cx with some BHS  -> complete total 10 days of Abx   -> D/w Dr Prince Zacarias   Leukopenia with eosinophilia  - known chronic leukocytosis now with neutropenia /leukopenia due EBV infection  - also had 30% eosinophilia ?  Drug reaction to Teflaro  - monitor for now   Recent MRSA BSI from rt buttocks abscess at [facility]  - admitted 7/16-8/2, blcx 7/16 2/2 positive, repeat 7/19 neg   - s/p I and D   - PICC 7/24 placed, sent out on Teflaro for total 4 weeks  - neg Blcx here   - PICC out 8/19   HIV since 1999  - Follows [facility]   - last CD4 607/ 19.6%, VL 5660 [ 6/28/17 ]  - ART - Tenofovir 25mg daily, emtricitabine 200mg q 72hr, raltegravir 400mg bid   - h/o non compliance, currently also non compliant , says did not get prescription of tenofovir, emtricitabine since discharge [ issue with pharmacy ], hence not taking any ART since discharge. -> hold ART since not taking anyway, will ask to resume when she has all meds at home  - f/up [facility] ID after discharge [ has appointment on 8/22 ]   CKD stage 3  - baseline cr around 3      DM type 2 uncontrolled - hba1c 9.8%     CAD s/p CABG x 4 in 2015     Comorbid :- HTN, TIA, HLP        MICROBIOLOGY:   [ [facility] 7/16 Blcx - 2/2 MRSA, 7/19 Blcx x 2 NTD, TTE neg ]      8/17  Blcx x 2 - ntd           Ucx - no growth   Throat cx neg strep but cx with few BHS     LINES/DRAINS:   Rt chest PICC 7/24 at [facility] Problems    Diagnosis Date Noted    Tonsillitis 08/20/2017    Leukopenia 08/17/2017    Anemia 08/17/2017    Dysphagia 08/17/2017    SAVI (acute kidney injury) ([facility] 08/17/2017    MRSA (methicillin resistant staph aureus) culture positive 08/17/2017    HIV (human immunodeficiency virus infection) ([facility] 04/30/2013    Diabetes mellitus type 2, controlled ([facility] 04/30/2013         Subjective: Interval notes reviewed. Afebrile now. Sleepy and code was called earlier for dec mentation. Plan for discharge today.     Current Facility-Administered Medications   Medication Dose Route Frequency    diphenhydrAMINE (BENADRYL) capsule 25 mg  25 mg Oral Q6H PRN    oxyCODONE-acetaminophen (PERCOCET) 5-325 mg per tablet 1-2 Tab  1-2 Tab Oral Q4H PRN    methylPREDNISolone (MEDROL DOSEPACK) tablet   Oral 6-DAY DOSEPAK TAPER    insulin detemir (LEVEMIR) injection 10 Units  10 Units SubCUTAneous Q12H    clindamycin phosphate (CLEOCIN) 600 mg in 0.9% sodium chloride (MBP/ADV) 100 mL ADV  600 mg IntraVENous Q8H    aspirin chewable tablet 81 mg  81 mg Oral DAILY    atorvastatin (LIPITOR) tablet 80 mg  80 mg Oral DAILY    losartan (COZAAR) tablet 50 mg  50 mg Oral DAILY    metoprolol tartrate (LOPRESSOR) tablet 100 mg  100 mg Oral BID    NIFEdipine ER (PROCARDIA XL) tablet 30 mg  30 mg Oral DAILY    clopidogrel (PLAVIX) tablet 75 mg  75 mg Oral DAILY    acetaminophen (TYLENOL) tablet 650 mg  650 mg Oral Q6H PRN    lidocaine (XYLOCAINE) 2 % viscous solution 5 mL  5 mL Mouth/Throat TID PRN    insulin lispro (HUMALOG) injection   SubCUTAneous AC&HS    glucose chewable tablet 16 g  4 Tab Oral PRN    glucagon (GLUCAGEN) injection 1 mg  1 mg IntraMUSCular PRN    dextrose (D50W) injection syrg 12.5-25 g  25-50 mL IntraVENous PRN    heparin (porcine) injection 5,000 Units  5,000 Units SubCUTAneous Q8H    acetaminophen (TYLENOL) suppository 650 mg  650 mg Rectal Q4H PRN         Objective:     Visit Vitals    /86 (BP 1 Location: Left arm)    Pulse 80    Temp 99 °F (37.2 °C)    Resp 16    Ht 5' 5\" (1.651 m)    Wt 106.6 kg (235 lb)    SpO2 96%    BMI 39.11 kg/m2       Temp (24hrs), Av.1 °F (37.3 °C), Min:99 °F (37.2 °C), Max:99.6 °F (37.6 °C)      General: Well developed, obese 43 y.o. Greek female in no distress   ENT: ENT exam normal, no neck nodes or sinus tenderness  Mouth:  could not visualize tonsils- large tongue  Neck: rt sided jugular lymphadenopathy   Cardio:  regular rate and rhythm, S1, S2 normal, no murmur  Chest: mild line scar from previous CABG, rt chest PICC intact - has come out 1-2 cm but no drainage or erythema  Lungs: clear to auscultation, no wheezes or rales and unlabored breathing  Abdomen: soft, non-tender. Bowel sounds normal. No masses, no organomegaly.   Extremities:  no redness or tenderness in the calves or thighs, no edema  Neuro: Grossly normal      Labs: Results:   Chemistry Recent Labs      17   0430  17   0940  17   0615   GLU  102*  239*  154*   NA  140  138  139   K  4.0  4.2  4.1   CL  111*  107  110*   CO2  20*  20*  19*   BUN  11  14  18   CREA  1.93*  2.14*  2.21*   CA  8.2*  8.1*  7.3*   AGAP  9  11  10   BUCR  6*  7*  8*      CBC w/Diff Recent Labs      17   0430  17   0940   WBC  11.6  11.3   RBC  3.71*  3.75*   HGB  10.1*  10.4*   HCT  30.3*  31.0*   PLT  410  442*   GRANS  51  36*   LYMPH  34  38   EOS  2  4        2017 12:38 PM - Eugenio, [facility]       Component Results      Component Value Flag Ref Range Units Status     EBV Ab VCA, IgM <36.0  0.0 - 35.9 U/mL Final     Comment:     (NOTE)                                   Negative        <36.0                                   Equivocal 36.0 - 43.9                                   Positive        >43.9        EBV Early Antigen Ab, IgG >150.0 (H) 0.0 - 8.9 U/mL Final     Comment:     (NOTE)                                   Negative        < 9.0                                   Equivocal  9.0 - 10.9                                   Positive        >10.9        EBV Ab VCA, IgG >600.0 (H) 0.0 - 17.9 U/mL Final     Comment:     (NOTE)                                   Negative        <18.0                                   Equivocal 18.0 - 21. 9                                   Positive        >21.9        EBV Nuclear Antigen Ab, IgG >600.0 (H) 0.0 - 17.9 U/mL Final     Comment:     (NOTE)                                   Negative        <18.0                                   Equivocal 18.0 - 21. 9                                   Positive        >21.9          Microbiology Results  Recent Labs      08/19/17   [address] 3 DAYS       Maikel Pond MD  August 22, 2017  [facility] AT THE [facility]  [phone]

## 2017-08-22 NOTE — PROGRESS NOTES
Care Management Interventions  PCP Verified by CM: Yes  Palliative Care Consult (Criteria: CHF and RRAT>21): No  Reason for No Palliative Care Consult: Other (see comment) (na)  Mode of Transport at Discharge: Other (see comment) (car)  Transition of Care Consult (CM Consult): [address]: Yes  MyChart Signup: No  Discharge Durable Medical Equipment: No  Physical Therapy Consult: No  Occupational Therapy Consult: No  Speech Therapy Consult: No  Current Support Network:  Other (lives with 23 yr old and 15 yr old children)  Confirm Follow Up Transport: Family  Plan discussed with Pt/Family/Caregiver: Yes  Freedom of Choice Offered: Yes  Discharge Location  Discharge Placement: Home with home health

## 2017-08-22 NOTE — DISCHARGE SUMMARY
[facility] [place] [facility]    Discharge Summary    Patient: Lucretia Quezada MRN: [id number]  CSN: [id number]    YOB: 1975  Age: 43 y.o.   Sex: female    DOA: 8/17/2017 LOS:  LOS: 5 days   Discharge Date: 8/22/2017     Admission Diagnoses: Hyperglycemia  Hyponatremia  Anemia  SAVI (acute kidney injury) ([facility]  Dehydration  HIV (human immunodeficiency virus infection) ([facility]  Dysphagia  Leukopenia  Elevated troponin  MRSA (methicillin resistant staph aureus) culture positive  Elevated troponin  SAVI (acute kidney injury) ([place] [address]  Dehydration  Hyponatremia  Anemia  Dysphagia  HIV (human immunodeficiency virus infection) ([place] [address]  Leukopenia  Hyperglycemia    Discharge Diagnoses:    Problem List as of 8/22/2017  Date Reviewed: 3/3/2016          Codes Class Noted - Resolved    * (Principal)Tonsillitis ICD-10-CM: J03.90  ICD-9-CM: 442  8/20/2017 - Present        Leukopenia ICD-10-CM: D72.819  ICD-9-CM: 288.50  8/17/2017 - Present        Anemia ICD-10-CM: D64.9  ICD-9-CM: 285.9  8/17/2017 - Present        Dysphagia ICD-10-CM: R13.10  ICD-9-CM: 787.20  8/17/2017 - Present        SAVI (acute kidney injury) ([place] [address] ICD-10-CM: N17.9  ICD-9-CM: 584.9  8/17/2017 - Present        MRSA (methicillin resistant staph aureus) culture positive ICD-10-CM: Z22.322  ICD-9-CM: V02.54  8/17/2017 - Present        Stroke ([place] [address] ICD-10-CM: I63.9  ICD-9-CM: 434.91  8/12/2015 - Present        Migraine ICD-10-CM: G43.909  ICD-9-CM: 346.90  8/12/2015 - Present        CAD (coronary artery disease) ICD-10-CM: I25.10  ICD-9-CM: 414.00  6/17/2015 - Present        HIV (human immunodeficiency virus infection) ([facility] ICD-10-CM: Z21  ICD-9-CM: V08  4/30/2013 - Present        Diabetes mellitus type 2, controlled ([facility] ICD-10-CM: E11.9  ICD-9-CM: 250.00  4/30/2013 - Present        Depressed ICD-10-CM: F32.9  ICD-9-CM: 311  4/30/2013 - Present        HTN (hypertension) ICD-10-CM: I10  ICD-9-CM: 401.9  4/30/2013 - Present        Pure hypercholesterolemia ICD-10-CM: E78.00  ICD-9-CM: 272.0  4/30/2013 - Present        RESOLVED: Dehydration ICD-10-CM: E86.0  ICD-9-CM: 276.51  8/17/2017 - 8/20/2017        RESOLVED: Hyponatremia ICD-10-CM: E87.1  ICD-9-CM: 276.1  8/17/2017 - 8/20/2017        RESOLVED: Hyperglycemia ICD-10-CM: R73.9  ICD-9-CM: 790.29  8/17/2017 - 8/20/2017        RESOLVED: Elevated troponin ICD-10-CM: R74.8  ICD-9-CM: 790.6  8/17/2017 - 8/20/2017        RESOLVED: Neck pain on right side ICD-10-CM: M54.2  ICD-9-CM: 723.1  8/17/2017 - 8/20/2017        RESOLVED: Eosinophilia ICD-10-CM: D72.1  ICD-9-CM: 288.3  8/17/2017 - 8/20/2017        RESOLVED: Groin pain ICD-10-CM: R10.30  ICD-9-CM: 789.09  12/26/2013 - 4/22/2014        RESOLVED: Carpal tunnel syndrome ICD-10-CM: G56.00  ICD-9-CM: 354.0  4/30/2013 - 11/19/2013              Discharge Condition: Stable    Discharge To: Home    Consults: Hospitalist, Infectious Disease and [address] [place] [address] Course: Mehran Kraft is a 43 y.o. female who presented with neck pain and dysphagia . Pain onset was day prior to admission. She reported it has been painful swallowing both liquids and solids and as a result has had nothing to eat and very little to drink since yesterday. Patient also did not take her insulin since she has was not eating. She was recently hospitalized at [facility] and was discharged home with a PICC line for Teflaro IV twice daily. She has HIV and claimed she has been compliant to her antiretroviral medications. She denied fever or chills. In the ED she had blood glucose 591 with no AG. She was started on insulin drip and IVF Normal saline bolus 3 Liters. She also c/o Right neck pain and she a had CT neck done with preliminary report showing a paramedian soft tissue lipoma but no neck abscess. Patient was leukopenic with WBC 1.6 which is recent. Patient was also started on Zosyn, Xyvox , Levaquin. Patient will be admitted for further treatment.  Teflaro was discontinued 2/2 leukopenia. Recent blood culture at [facility] was positive for MRSA but sensitive to some antibiotics including Xyvox. Patient informed informed ED physician that she would like to be transferred to [facility] since all her treatment have been there . The ED physician contacted ED team at [address] [facility] but they where not able to accept patient because they were at capacity and are not accepting transfers currently. Patient was admitted to ICU for ongoing medical management. ID was consulted. Patient continued with IV antibiotics. Throat culture was obtained to rule out strep which was positive for few streptococci, beta hemolytic group F. Patient was also noted to be positive to EBV by serology. CT scan neck consistent with tonsillitis without abscess. Patient was transferred to telemetry after improved handling of oral secretions and was able to tolerate liquids. Patient continued to convalesce without incidence. Patient is stable for discharge home with oral Clindamycin to complete a 10 day course and instructions to follow up with [facility] regarding resumption of HAART.        Physical Exam:  General appearance: alert, cooperative, no distress, appears stated age  HEENT: cervical lymphadnopathy present on exam   Lungs: bases diminished bilaterally 2/2 body habitus, no wheezes or rhonchi   Heart: regular rate and rhythm, S1, S2 normal, no murmur, click, rub or gallop  Abdomen: soft, non tender, non distended. Bowel sounds normoactive   Extremities: extremities normal, atraumatic, no cyanosis.  Trace BLE edema   Skin: Skin color, texture, turgor normal. No rashes or lesions  Neurologic: Grossly normal  PSY: appropriately behaved    Discharge Medications:     Current Discharge Medication List      START taking these medications    Details   methylPREDNISolone (MEDROL DOSEPACK) 4 mg tablet Take as instructed  Qty: 1 Dose Pack, Refills: 0      lidocaine (XYLOCAINE) 2 % solution Take 5 mL by mouth three (3) times daily as needed for Pain (gargle and then spit out). Qty: 1 Bottle, Refills: 0      oxyCODONE-acetaminophen (PERCOCET) 5-325 mg per tablet Take 1-2 Tabs by mouth every four (4) hours as needed. Max Daily Amount: 12 Tabs. Qty: 12 Tab, Refills: 0      clindamycin (CLEOCIN) 300 mg capsule Take 1 Cap by mouth three (3) times daily. Qty: 24 Cap, Refills: 0         CONTINUE these medications which have CHANGED    Details   atorvastatin (LIPITOR) 80 mg tablet Take 1 Tab by mouth daily. Qty: 30 Tab, Refills: 0      clopidogrel (PLAVIX) 75 mg tab Take 1 Tab by mouth daily. Qty: 30 Tab, Refills: 0      losartan (COZAAR) 50 mg tablet Take 1 Tab by mouth daily. Qty: 30 Tab, Refills: 0      metoprolol tartrate (LOPRESSOR) 100 mg IR tablet Take 1 Tab by mouth two (2) times a day. Qty: 60 Tab, Refills: 0      NIFEdipine ER (NIFEDICAL XL) 30 mg ER tablet Take 1 Tab by mouth daily. Qty: 30 Tab, Refills: 0      SITagliptin (JANUVIA) 100 mg tablet Take 1 Tab by mouth daily. Qty: 30 Tab, Refills: 0    Associated Diagnoses: Encounter for Depo-Provera contraception; HIV (human immunodeficiency virus infection) ([facility] Depressed; Pure hypercholesterolemia; Myalgia; Routine general medical examination at a health care facility; Contraception      raltegravir (ISENTRESS) 400 mg tablet Take 1 Tab by mouth two (2) times a day. Qty: 60 Tab, Refills: 0    Associated Diagnoses: HIV (human immunodeficiency virus infection) ([facility] Depressed; Essential hypertension; Pure hypercholesterolemia; Groin pain, unspecified laterality      emtricitabine-tenofovir, TDF, (TRUVADA) 200-300 mg per tablet Take 1 Tab by mouth daily. Qty: 30 Tab, Refills: 0         CONTINUE these medications which have NOT CHANGED    Details   LANTUS SOLOSTAR 100 unit/mL (3 mL) pen START 74 UNITS TWO TIMES A DAY .  INCREASE BY 1 UNITS AM AND PM EVERY 3 DAYS UNTIL FBG IS CONSISTENTLY 150  Qty: 5 Each, Refills: 0    Associated Diagnoses: HIV (human immunodeficiency virus infection) ([facility] Depressed; Pure hypercholesterolemia; Diabetes mellitus type 2, controlled ([facility] Coronary artery disease involving native coronary artery without angina pectoris      amitriptyline (ELAVIL) 100 mg tablet Take 100 mg by mouth nightly. Associated Diagnoses: HIV (human immunodeficiency virus infection) ([facility] Diabetes mellitus type 2, controlled ([facility] Stroke [facility]); Depressed; Essential hypertension; Pure hypercholesterolemia; Coronary artery disease involving native coronary artery without angina pectoris; Migraine without aura and with status migrainosus, not intractable      Insulin Needles, Disposable, (BD INSULIN PEN NEEDLE UF ORIG) 29 gauge X 1/2 \" ndle Diagnosis 250.00 use once daily  Qty: 100 Each, Refills: 12    Associated Diagnoses: HIV (human immunodeficiency virus infection) ([facility] Type II or unspecified type diabetes mellitus without mention of complication, not stated as uncontrolled; Depressed; Pure hypercholesterolemia; Routine general medical examination at a health care facility; Contraception; Encounter for Depo-Provera contraception; Myalgia      glucose blood VI test strips (BLOOD GLUCOSE TEST) strip Test twice a day -- dispense any reliable brand to work with patients glucometer  Qty: 1 Package, Refills: 11    Associated Diagnoses: HIV (human immunodeficiency virus infection) ([facility] Type II or unspecified type diabetes mellitus without mention of complication, not stated as uncontrolled; Depressed; Pure hypercholesterolemia; Routine general medical examination at a health care facility; Contraception; Encounter for Depo-Provera contraception; Myalgia      aspirin 81 mg chewable tablet Take 81 mg by mouth daily. Blood-Glucose Meter monitoring kit Any reliable brand -- test twice a day -- dx 250.00  Qty: 1 kit, Refills: 0    Associated Diagnoses: HIV (human immunodeficiency virus infection) ([facility]  Type II or unspecified type diabetes mellitus without mention of complication, not stated as uncontrolled; Depressed; HTN (hypertension); Pure hypercholesterolemia; Carpal tunnel syndrome, right; Encounter for Depo-Provera contraception; Myalgia; Routine general medical examination at a health care facility; Contraception; Groin pain; Cellulitis; Chest congestion; Cough;  Need for influenza vaccination         STOP taking these medications       HYDROcodone-acetaminophen (NORCO) 5-325 mg per tablet Comments:   Reason for Stopping:               Activity: Activity as tolerated    Diet: Diabetic Diet    Wound Care: None needed    Follow-up: PCP within 1 week  [facility] within 1 week     Discharge time: 50 minutes   Loren Cason NP  8/22/2017, 10:06 AM

## 2017-08-22 NOTE — DIABETES MGMT
NUTRITIONAL GLYCEMIC CONTROL FOLLOW UP/ PLAN OF CARE     Caitlin Snell           43 y.o.           8/17/2017                 1. MRSA bacteremia    2. Leukopenia, unspecified type    3. Hyperglycemia    4. Hypotension, unspecified hypotension type    5. HIV (human immunodeficiency virus infection) ([facility]    6. Dysphagia, unspecified type    7. Chronic renal insufficiency, unspecified stage    8. Encounter for Depo-Provera contraception    9. Depressed    10. Pure hypercholesterolemia    11. Myalgia    12. Routine general medical examination at a health care facility    13. Contraception    14. Essential hypertension    15. Groin pain, unspecified laterality       INTERVENTIONS/PLAN:   1. Suggest either full liquids diet or if ready for solid food, 5409-8961 calorie CHO consistent, mechanical soft diet. 2.  Monitor glycemic control, labs, weights and feeding status. ASSESSMENT:   Nutritional Status:  Pt is 186% ideal wt; BMI (calculated): 38.9 kg/m2 (obese wt classification). Pt appears well nourished. Noted pt on clear liquid diet and appears ready to advancement to at least full liquids. Diabetes Management:   On 8/18/17 pt stated she takes her insulin daily but did not take it the day of admissin due to not being able to eat. Pt states she has a meter at home and her recent BG has been in the 200's. Pt appear to have high meal time insulin requirements at home but current glycemic control is good with total of 20 units Levemir/d with corrective lispro.   GFR - 24  Recent blood glucose:   8/22/17:  97, 209  8/21/17:  253, 228, 142, 180 - pt received total daily insulin dose = 35 units  Within target range (non-ICU: <140; ICU<180): [] Yes   []  No, but progressing towards targets    Current Insulin regimen:   Levemir 10 units q 12 hours  Corrective lispro ACHS, very insulin resistant  Home medication/insulin regimen:   Lantus (solostar pen) - 65 units/day  Humalog TID - pt does not know the dose without looking at her \"chart\". Per Chart Everywhere chart review, pt was discharged from [facility] on 8/2/17 with instructions to take Lantus, 69 units/d and Humalog 42 units at breakfast, 17 units at lunch and 25 units at supper. HbA1c:  9.8% - ave BG has been ~ 234 mg/dL over past 3 months. Adequate glycemic control PTA:  [] Yes  [x] No       SUBJECTIVE/OBJECTIVE:   Information obtained from: chart review, pt  On 8/18/17 pt stated she had likely lost weight from not being able to swallow PTA but was unable to quantify. Pt denied having food allergies. Today pt states she is just a little hungry. Pt has solid food that has been brought in by family. Pt states she is going home after dinner tonight. Pt presented to ED with symptoms of neck pain and dysphagia and admitted with dehydration, hyponatremia, leukopenia, hyperglycemia, elevated troponin, SAVI, MRSA and PMhx of T2DM, HTN, HIV, CAD. Pt was recently hospitalized at [facility] with abscess on buttocks/MRSA and discharged with a PICC   .   Diet: clear liquids    Patient Vitals for the past 100 hrs:   % Diet Eaten   08/22/17 1121 25 %   08/19/17 1342 50 %   08/18/17 1200 0 %     Medications: [x]                Reviewed     Most Recent POC Glucose:   Recent Labs      08/22/17   0430  08/21/17   0940  08/20/17   0615   GLU  102*  239*  154*         Labs:   Lab Results   Component Value Date/Time    Hemoglobin A1c 9.8 08/17/2017 02:18 AM     Lab Results   Component Value Date/Time    Hemoglobin A1c 9.8 08/17/2017 02:18 AM    Hemoglobin A1c 11.3 12/01/2015 10:55 AM    Hemoglobin A1c 8.4 09/25/2015 01:46 PM     Lab Results   Component Value Date/Time    Sodium 140 08/22/2017 04:30 AM    Potassium 4.0 08/22/2017 04:30 AM    Chloride 111 08/22/2017 04:30 AM    CO2 20 08/22/2017 04:30 AM    Anion gap 9 08/22/2017 04:30 AM    Glucose 102 08/22/2017 04:30 AM    BUN 11 08/22/2017 04:30 AM    Creatinine 1.93 08/22/2017 04:30 AM    Calcium 8.2 08/22/2017 04:30 AM    Magnesium 2.2 08/17/2017 02:18 AM    Albumin 2.3 08/17/2017 02:18 AM       Anthropometrics: IBW : 56.8 kg (125 lb 3.5 oz), % IBW (Calculated): 186.44 %, BMI (calculated): 39.1  Wt Readings from Last 1 Encounters:   08/22/17 106.6 kg (235 lb)      Ht Readings from Last 1 Encounters:   08/18/17 5' 5\" (1.651 m)       Estimated Nutrition Needs:  1759 Kcals/day, Protein (g): 68 g Fluid (ml): 1600 ml  Based on:   [x]          Actual BW    []          ABW   []            Adjusted BW        Nutrition Diagnoses:   Obesity due to excess energy intake as evidenced by BMI of 38.9 kg/m2. Altered nutrition related labs due to diabetes as evidenced by A1C of 9.8%. Inadequate oral food and beverage intake due to dysphagia as evidenced by orders for clear liquids diet. Nutrition Interventions:  Diet advancement recommendation  Goal:   Blood glucose will be within target range of  mg/dL by 8/20/17. Wt maintenance (+/- 1-2 kg) or slow, gradual wt loss (1-2#) by 8/27/17. Po intake will meet >75% meals by 8/22/17.         Nutrition Monitoring and Evaluation      [x]     Monitor po intake on meal rounds  [x]     Continue inpatient monitoring and intervention  []     Other:      Nutrition Risk:  []   High     [x]  Moderate    []  Minimal/Uncompromised    Alma Delia Abdullahi RD, CDE   Office:  [address] [place] [address] Pager:  [phone]

## 2017-08-22 NOTE — ANCILLARY DISCHARGE INSTRUCTIONS
Core measure, RRAT score 20: follow up appointment scheduled on 8/28/17 @ 8am with Dr. Blanquita Damon.

## 2017-08-22 NOTE — PROGRESS NOTES
Responded to RRT. Patient reported falling to her knees after getting up to the bathroom without non skid socks. Patient denied LOC, dizziness. Patient assisted with pretty care and assisted back to bed by nursing staff.  AUGUSTINA Jean-Baptiste-BC

## 2017-08-22 NOTE — DISCHARGE INSTRUCTIONS
FOLLOW UP VISIT Appointment in: Other (Specify) PCP within 1 week           Electrolyte Imbalance: Care Instructions  Your Care Instructions  Electrolytes are minerals in your blood. They include sodium, potassium, calcium, and magnesium. When they are not at the right levels, you can feel very ill. You may not know what is causing it, but you know something is wrong. You may feel weak or numb, have muscle spasms, or twitch. Your heart may beat fast. Symptoms are different with each mineral. Too much is as bad as too little. Minerals help keep your body working as it should. Vomiting, diarrhea, and fever can cause you to lose minerals. A problem with your kidneys can tip a mineral out of balance. So can taking certain medicines. Your doctor may do more tests. He or she may change your medicine and diet. If you are low in one or more minerals, they may be given through a tube into your vein (IV). Your doctor may have you take or drink special fluids or pills. If your kidneys are failing, your blood may be filtered. This is called dialysis. It can put the minerals back in balance. Follow-up care is a key part of your treatment and safety. Be sure to make and go to all appointments, and call your doctor if you are having problems. It's also a good idea to know your test results and keep a list of the medicines you take. How can you care for yourself at home? · Take your medicines exactly as prescribed. Call your doctor if you have any problems with your medicines. You will get more details on the specific medicines your doctor prescribes. · Do not take any medicine without talking to your doctor first. This includes prescription, over-the-counter, and herbal medicines. · If you have kidney, heart, or liver disease and have to limit fluids, talk with your doctor before you increase the amount of fluids you drink. · Your doctor or dietitian may give you a diet plan to help balance your minerals.  Follow the diet carefully. When should you call for help? Call 911 anytime you think you may need emergency care. For example, call if:  · You passed out (lost consciousness). · Your heart seems to be speeding up and then slowing down or skipping beats. Call your doctor now or seek immediate medical care if:  · You are very tired and have no energy. · You have trouble thinking or concentrating. Watch closely for changes in your health, and be sure to contact your doctor if:  · You want advice on what to do to keep your minerals in balance. · You do not get better as expected. Where can you learn more? Go to http://alexandra-pantera.info/. Enter K876 in the search box to learn more about \"Electrolyte Imbalance: Care Instructions. \"  Current as of: July 26, 2016  Content Version: 11.3  © 4669-8668 [employer]. Care instructions adapted under license by [employer] (which disclaims liability or warranty for this information). If you have questions about a medical condition or this instruction, always ask your healthcare professional. [address] any warranty or liability for your use of this information. HIV: Care Instructions  Your Care Instructions  Human immunodeficiency virus, or HIV, is a virus that attacks your immune system. This makes it hard for your body to fight infection and disease. HIV is the virus that causes AIDS (acquired immunodeficiency syndrome). But having HIV does not mean that you have AIDS. Treatment of HIV may prevent or delay HIV from developing into AIDS. HIV often causes flu-like symptoms soon after a person gets infected. These early symptoms go away in a few weeks. After that, you may not have signs of illness for many years. But as the virus multiplies in your body, symptoms reappear and then remain. Fatigue, weight loss, fever, night sweats, diarrhea, and other symptoms are common.  If HIV is not treated and progresses to AIDS, your symptoms get worse and your body is less and less able to fight infections like pneumonia and tuberculosis. Medicines are the main treatment for HIV. You will likely have to take several medicines, sometimes called an anti-HIV \"cocktail. \" By fighting the virus, these medicines can help your immune system stay healthy and delay or prevent AIDS, and may help you live longer. Medicines for HIV are called antiretrovirals. Follow-up care is a key part of your treatment and safety. Be sure to make and go to all appointments, and call your doctor if you are having problems. It's also a good idea to know your test results and keep a list of the medicines you take. How can you care for yourself at home? · If you are taking medicines for HIV, take them exactly as directed, in the right dose and at the right time. Call your doctor if you think you are having a problem with your medicine. · Learn how to shop for, prepare, and store food safely to reduce your risk of food poisoning. People with HIV are more likely to get food-borne diseases. · Stop smoking. People with HIV have an increased risk of heart attacks and lung cancer. Smoking increases these risks even more. If you need help quitting, talk to your doctor about stop-smoking programs and medicines. These can increase your chances of quitting for good. · Do not use illegal drugs, and limit your use of alcohol. Using intravenous (IV) illegal drugs increases the risk that your HIV will get worse and makes it harder to follow your treatment plan. Abuse of alcohol, marijuana, cocaine, or other illegal drugs also makes HIV get worse faster. · Eat a healthy diet. Good nutrition can help your immune system and improve your overall health. Staying at a healthy weight can be hard, since weight loss and digestion problems are common with HIV and are side effects of some HIV medicines. Sometimes you just won't feel like eating.  Talk to your doctor or see a dietitian if you need help. · Get regular exercise. It relieves stress. It also keeps your heart, lungs, and muscles strong and helps you feel less tired. It may also help your immune system work better. · Learn more about HIV. This will let you take a more active role in your care. It may help you feel more in control of your life. · Join a support group. Support groups can be a good place to share information, problem-solving tips, and emotions. To avoid spreading HIV to others  · Take antiretroviral medicines. Getting treated for HIV can help prevent the spread of HIV to people who are not infected. · Tell your sex partner or partners that you have HIV. Do not have sex with anyone unless you have told him or her that you have HIV. · If you and your partner choose to have sex, always use a latex condom. · Do not share needles if you use IV drugs. · Do not share toothbrushes, razors, sex toys, or other items that may have blood, semen, or vaginal fluids on them. · Do not donate blood, plasma, semen, body organs, or body tissues. When should you call for help? Call 911 anytime you think you may need emergency care. For example, call if:  · You have seizures. · You passed out (lost consciousness). · You have new weakness in an arm, a leg, or on one side of your body. · You have new changes in balance or sensation (numbness, tingling, or pain). · You are suddenly not able to stand or walk. Call your doctor now or seek immediate medical care if:  · You have a fever that ever reaches 103°F or higher. · You have a fever of 101°F or higher for 24 hours. · You have shortness of breath. · You have unusual bleeding, such as from your nose or gums, blood in your urine or stool, or easy bruising. · You have changes in vision. Watch closely for changes in your health, and be sure to contact your doctor if:  · You have a cough that does not go away, especially if you also have a fever.   · You have rapid, unexplained weight loss. · You have night sweats. · You have swollen lymph nodes in your neck, armpits, or groin. · You feel very tired. Where can you learn more? Go to http://alexandra-pantera.info/. Enter O128 in the search box to learn more about \"HIV: Care Instructions. \"  Current as of: March 3, 2017  Content Version: 11.3  © 4119-2984 [employer]. Care instructions adapted under license by [employer] (which disclaims liability or warranty for this information). If you have questions about a medical condition or this instruction, always ask your healthcare professional. [place] [address] any warranty or liability for your use of this information. HIV Medicine Management: Care Instructions  Your Care Instructions  Taking antiretroviral medicines for HIV may allow you to stay healthy for a long time. Successful treatment of your HIV infection depends on following a strict schedule for taking medicine. Not taking medicine when you are supposed to can lead to problems such as drug resistance and higher viral loads, and the disease may get worse. In the past, schedules for taking medicine for HIV were very complicated. Taking many pills several times each day was difficult. But the routine has become much more simple, and you may only have to take medicine one or two times each day. Follow-up care is a key part of your treatment and safety. Be sure to make and go to all appointments, and call your doctor if you are having problems. It's also a good idea to know your test results and keep a list of the medicines you take. How can you care for yourself at home? Staying on your schedule  · Know the names of all of your medicines. Ask your doctor or pharmacist to clearly explain the actions and purpose of each of your medicines. If you understand what you are taking and how it helps, it may be easier to follow your schedule.  Write down the generic name (and brand name if there is one) for all of your medicines. Have your doctor check the list.  · Know when to take your medicine. Write down a schedule, and have your doctor check it. Try to take them around the same times every day. · Use a pillbox with compartments for each time you need to take your medicines. Using a pillbox lets you know when you have taken each dose so that you do not miss a dose. And it also will help you not take too many pills. · Know how to handle missed doses. Talk with your doctor about what you should do if you miss a dose. Discuss what to do for each medicine--it may be different for each one. Other tips  · Always check with your doctor before taking any other medicines. This includes over-the-counter medicines and any herbal or \"natural\" supplements. · Learn about other medicines you should not take at the same time as your antiretroviral medicines. · Store medicines properly. Find out from your doctor or pharmacist how to properly store your medicines. Keeping medicines in a location that is too hot or too cold may decrease how well they work. Always store medicines out of the reach of children. · Watch for side effects. Ask your doctor or pharmacist what to expect. Tell your doctor right away if you have any serious side effects. Do not stop taking your medicine or change the dose on your own. This could cause the medicine to stop working. Work with your doctor to find a solution. · Make a list of all the medicines you take, and bring it with you to each visit with your doctor. Have your doctor review your list at each visit. When should you call for help? Call 911 anytime you think you may need emergency care. For example, call if:  · You are wheezing or having trouble breathing after starting a medicine. · Your lips, throat, tongue, or face are swelling quickly. Call your doctor now or seek immediate medical care if:  · You have problems taking your medicine.   · You have trouble taking your medicine when you are supposed to. · You notice side effects from your medicine. These may include:  ¨ A skin rash. ¨ A fever. ¨ Nausea and vomiting. ¨ Fatigue. ¨ Trouble breathing. Watch closely for changes in your health, and be sure to contact your doctor if you have any problems. Where can you learn more? Go to http://alexandra-pantera.info/. Enter 0664 [id number] 28 [id number] in the search box to learn more about \"HIV Medicine Management: Care Instructions. \"  Current as of: March 3, 2017  Content Version: 11.3  © 5051-8378 [employer]. Care instructions adapted under license by [employer] (which disclaims liability or warranty for this information). If you have questions about a medical condition or this instruction, always ask your healthcare professional. [address] any warranty or liability for your use of this information. Tonsillitis: Care Instructions  Your Care Instructions    Tonsillitis is an infection of the tonsils that is caused by bacteria or a virus. The tonsils are in the back of the throat and are part of the immune system. Tonsillitis typically lasts from a few days up to a couple of weeks. Tonsillitis caused by a virus goes away on its own. Tonsillitis caused by the bacteria that causes strep throat is treated with antibiotics. You and your doctor may consider surgery to remove the tonsils (tonsillectomy) if you have serious complications or repeat infections. Follow-up care is a key part of your treatment and safety. Be sure to make and go to all appointments, and call your doctor if you are having problems. It's also a good idea to know your test results and keep a list of the medicines you take. How can you care for yourself at home? · If your doctor prescribed antibiotics, take them as directed. Do not stop taking them just because you feel better.  You need to take the full course of antibiotics. · Gargle with warm salt water. This helps reduce swelling and relieve discomfort. Gargle once an hour with 1 teaspoon of salt mixed in 8 fluid ounces of warm water. · Take an over-the-counter pain medicine, such as acetaminophen (Tylenol), ibuprofen (Advil, Motrin), or naproxen (Aleve). Be safe with medicines. Read and follow all instructions on the label. No one younger than 20 should take aspirin. It has been linked to Reye syndrome, a serious illness. · Be careful when taking over-the-counter cold or flu medicines and Tylenol at the same time. Many of these medicines have acetaminophen, which is Tylenol. Read the labels to make sure that you are not taking more than the recommended dose. Too much acetaminophen (Tylenol) can be harmful. · Try an over-the-counter throat spray to relieve throat pain. · Drink plenty of fluids. Fluids may help soothe an irritated throat. Drink warm or cool liquids (whichever feels better). These include tea, soup, and juice. · Do not smoke, and avoid secondhand smoke. Smoking can make tonsillitis worse. If you need help quitting, talk to your doctor about stop-smoking programs and medicines. These can increase your chances of quitting for good. · Use a vaporizer or humidifier to add moisture to your bedroom. Follow the directions for cleaning the machine. When should you call for help? Call your doctor now or seek immediate medical care if:  · Your pain gets worse on one side of your throat. · You have a new or higher fever. · You notice changes in your voice. · You have trouble opening your mouth. · You have any trouble breathing. · You have much more trouble swallowing. · You have a fever with a stiff neck or a severe headache. · You are sensitive to light or feel very sleepy or confused. Watch closely for changes in your health, and be sure to contact your doctor if:  · You do not get better after 2 days. Where can you learn more?   Go to http://alexandra-pantera.info/. Enter L565 in the search box to learn more about \"Tonsillitis: Care Instructions. \"  Current as of: July 29, 2016  Content Version: 11.3  © 7844-8979 [employer], [facility]. Care instructions adapted under license by [employer] (which disclaims liability or warranty for this information). If you have questions about a medical condition or this instruction, always ask your healthcare professional. [place] [address] any warranty or liability for your use of this information.

## 2017-08-22 NOTE — PROGRESS NOTES
Offered the pt FOC for home care, she chose [address]. Pt verified the contact information on the face sheet is correct. Referral sent to intake via [facility] and called to the [address] [place] [address] for f/u. Pt refused a r/w for home use.

## 2017-08-22 NOTE — PROGRESS NOTES
1709: Rapid response called. Pt in restroom, fell in rest room. No respiratory Therapy services required at this time.

## 2017-08-22 NOTE — ROUTINE PROCESS
Bedside, Verbal and Written shift change report given to Arlina Dance, RN (oncoming nurse) by Jennifer Wright RN (offgoing nurse). Report included the following information SBAR, Kardex, Intake/Output, MAR, Recent Results, Med Rec Status, Procedure Summary and Cardiac Rhythm NSR/ST.      4892 - Shift assessment completed. Pt alert and oriented x4. No respiratory distress noted. No c/o pain reported. Call bell within reach, bed in low position. Will continue to monitor.      0825 - RRT called. Pt's family notified staff that pt fell on floor. Pt found by Bola Millard RN kneeling on the floor. Pt's bathroom had water, blood, and urine on floor. Pt stated she was going to the bathroom and slipped on the floor. Pt not wearing skid proof socks. Pt stated she didn't hit her head or have any injuries from fall, no c/o dizzines, no c/o pain other than her admitting diagnosis complaints. Cleaned pt up and returned pt back to bed. Pt's vital signs stable at this time, please see flow sheet. [address] [facility] Deniz Victoria RN ordered to discontinue pt's Morphine and ordered Percocet 5-325 mg 1-2 tablets q4h PRN    0845 - RRT ended. 1020 - Pt c/o pain to right jaw and neck, PRN Percocet administered.     1235 - Shift re-assessment completed, no change in pt condition.      1500 - Pt refusing Heparin at this time. Pt is on her period.    1635 - Shift re-assessment completed, no change in pt condition.       1730 - I have reviewed discharge instructions with the patient. The patient verbalized understanding. Discharge medications reviewed with patient and appropriate educational materials and side effects teaching were provided. IV catheter discontinued intact. Site without signs and symptoms of complications. Dressing and pressure applied. Patient armband removed and shredded. 1805 - Pt discharged to home with her family. All pt belongings went with her.

## 2017-08-23 LAB
BACTERIA SPEC CULT: NORMAL
BACTERIA SPEC CULT: NORMAL
HIV 1 & 2 AB SER-IMP: ABNORMAL
HIV 2 AB SERPL QL IA: NEGATIVE
HIV1 AB SERPL QL IA: POSITIVE
HIV1+2 AB SPEC QL IA.RAPID: POSITIVE
SERVICE CMNT-IMP: NORMAL
SERVICE CMNT-IMP: NORMAL

## 2017-08-24 ENCOUNTER — PATIENT OUTREACH (OUTPATIENT)
Dept: FAMILY MEDICINE CLINIC | Age: 42
End: 2017-08-24

## 2017-08-24 NOTE — PROGRESS NOTES
Nurse Navigator  POST HOSPITALIZATION NOTE  Admitted at [facility] on 08/17/17 for tonsilitis, hyperglycemia  Discharged to Home on 08/22/17        Attempted to reach patient. Unable to reach. Phone number not accepting incoming calls    Noted patient last seen in office 03/03/2016. Noted in [address] [facility] note pt has PCP Dr. Nelson Celeste. Also noted has appt with Dr. Molly Shaw 08/28/17 at 8am.    Letter sent            This note will not be viewable in [address]

## 2017-08-24 NOTE — LETTER
8/24/2017 3:01 PM 
 
Ms. Ashley Huynh [address] [id number] Dear Ms. Caleb Anand, 
 
 
I am a Nurse Navigator working with your primary care provider (PCP), Dr. Blanquita Damon. Part of my job is to follow up with patients who have been in the hospital to see how they are feeling, answer any questions they may have about their visit, and also make sure they have a follow-up appointment to see their primary care doctor. I have been unable to reach you by telephone and wanted to follow up with you regarding your recent visit to the hospital.   
 
Your last office visit at [facility] was 03/2016. You are currently scheduled to see Dr. Blanquita Damon on 08/28/17 at 8am.  
 
It is noted that you have a new PCP. If you are currently seeing another provider to manage your care, please contact our office to cancel the appointment and update your chart. If your PCP is still Dr. Blanquita Damon, we look forward to seeing you at your appointment If you have any questions or concerns, please call [phone]. Thank you for allowing us to participate in your care! Sincerely, Alvaro DUFFYN, RN   Nurse Navigator [address] Office   [phone] Fax   [phone]

## 2017-08-25 ENCOUNTER — HOME CARE VISIT (OUTPATIENT)
Dept: HOME HEALTH SERVICES | Facility: HOME HEALTH | Age: 42
End: 2017-08-25

## 2017-08-27 ENCOUNTER — HOME CARE VISIT (OUTPATIENT)
Dept: SCHEDULING | Facility: HOME HEALTH | Age: 42
End: 2017-08-27

## 2018-10-16 ENCOUNTER — APPOINTMENT (OUTPATIENT)
Dept: GENERAL RADIOLOGY | Age: 43
End: 2018-10-16
Attending: EMERGENCY MEDICINE
Payer: MEDICARE

## 2018-10-16 ENCOUNTER — HOSPITAL ENCOUNTER (EMERGENCY)
Age: 43
Discharge: HOME | End: 2018-10-16
Attending: EMERGENCY MEDICINE
Payer: MEDICARE

## 2018-10-16 VITALS
BODY MASS INDEX: 37.1 KG/M2 | TEMPERATURE: 97.8 F | OXYGEN SATURATION: 95 % | WEIGHT: 222.66 LBS | DIASTOLIC BLOOD PRESSURE: 110 MMHG | HEIGHT: 65 IN | RESPIRATION RATE: 20 BRPM | SYSTOLIC BLOOD PRESSURE: 204 MMHG | HEART RATE: 94 BPM

## 2018-10-16 DIAGNOSIS — M25.462 KNEE EFFUSION, LEFT: Primary | ICD-10-CM

## 2018-10-16 DIAGNOSIS — S83.422A SPRAIN OF LATERAL COLLATERAL LIGAMENT OF LEFT KNEE, INITIAL ENCOUNTER: ICD-10-CM
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
## 2018-10-16 PROCEDURE — 74011250636 HC RX REV CODE- 250/636: Performed by: EMERGENCY MEDICINE

## 2018-10-16 PROCEDURE — 73564 X-RAY EXAM KNEE 4 OR MORE: CPT

## 2018-10-16 PROCEDURE — 73501 X-RAY EXAM HIP UNI 1 VIEW: CPT

## 2018-10-16 PROCEDURE — 73552 X-RAY EXAM OF FEMUR 2/>: CPT

## 2018-10-16 PROCEDURE — 73562 X-RAY EXAM OF KNEE 3: CPT

## 2018-10-16 PROCEDURE — 90715 TDAP VACCINE 7 YRS/> IM: CPT | Performed by: EMERGENCY MEDICINE

## 2018-10-16 PROCEDURE — 90471 IMMUNIZATION ADMIN: CPT

## 2018-10-16 PROCEDURE — 73030 X-RAY EXAM OF SHOULDER: CPT

## 2018-10-16 PROCEDURE — 99284 EMERGENCY DEPT VISIT MOD MDM: CPT

## 2018-10-16 PROCEDURE — 74011250637 HC RX REV CODE- 250/637: Performed by: EMERGENCY MEDICINE

## 2018-10-16 RX ORDER — DICLOFENAC SODIUM 10 MG/G
GEL TOPICAL
Qty: 100 G | Refills: 0 | Status: SHIPPED | OUTPATIENT
Start: 2018-10-16

## 2018-10-16 RX ORDER — IBUPROFEN 600 MG/1
600 TABLET ORAL ONCE
Status: COMPLETED | OUTPATIENT
Start: 2018-10-16 | End: 2018-10-16

## 2018-10-16 RX ORDER — ACETAMINOPHEN 500 MG
1000 TABLET ORAL ONCE
Status: COMPLETED | OUTPATIENT
Start: 2018-10-16 | End: 2018-10-16

## 2018-10-16 RX ADMIN — TETANUS TOXOID, REDUCED DIPHTHERIA TOXOID AND ACELLULAR PERTUSSIS VACCINE, ADSORBED 0.5 ML: 5; 2.5; 8; 8; 2.5 SUSPENSION INTRAMUSCULAR at 13:30

## 2018-10-16 RX ADMIN — IBUPROFEN 600 MG: 600 TABLET, FILM COATED ORAL at 13:30

## 2018-10-16 RX ADMIN — ACETAMINOPHEN 1000 MG: 500 TABLET, FILM COATED ORAL at 13:30

## 2018-10-16 NOTE — ED NOTES
I have reviewed discharge instructions with the patient. The patient verbalized understanding. Patient armband removed and given to patient to take home.   Patient was informed of the privacy risks if armband lost or stolen,

## 2018-10-16 NOTE — DISCHARGE INSTRUCTIONS
Knee Sprain: Care Instructions  Your Care Instructions    A knee sprain is one or more stretched, partly torn, or completely torn knee ligaments. Ligaments are bands of ropelike tissue that connect bone to bone and make the knee stable. The knee has four main ligaments. Knee sprains often happen because of a twisting or bending injury from sports such as skiing, basketball, soccer, or football. The knee turns one way while the lower or upper leg goes another way. A sprain also can happen when the knee is hit from the side or the front. If a knee ligament is slightly stretched, you will probably need only home treatment. You may need a splint or brace (immobilizer) for a partly torn ligament. A complete tear may need surgery. A minor knee sprain may take up to 6 weeks to heal, while a severe sprain may take months. Follow-up care is a key part of your treatment and safety. Be sure to make and go to all appointments, and call your doctor if you are having problems. It's also a good idea to know your test results and keep a list of the medicines you take. How can you care for yourself at home? · Follow instructions about how much weight you can put on your leg and how to walk with crutches. · Prop up your leg on a pillow when you ice it or anytime you sit or lie down for the next 3 days. Try to keep it above the level of your heart. This will help reduce swelling. · Put ice or a cold pack on your knee for 10 to 20 minutes at a time. Try to do this every 1 to 2 hours for the next 3 days (when you are awake) or until the swelling goes down. Put a thin cloth between the ice and your skin. Do not get the splint wet. · If you have an elastic bandage, make sure it is snug but not so tight that your leg is numb, tingles, or swells below the bandage. You can loosen the bandage if it is too tight. · Your doctor may recommend a brace (immobilizer) to support your knee while it heals. Wear it as directed.   · Ask your doctor if you can take an over-the-counter pain medicine, such as acetaminophen (Tylenol), ibuprofen (Advil, Motrin), or naproxen (Aleve). Be safe with medicines. Read and follow all instructions on the label. When should you call for help? Call 911 anytime you think you may need emergency care. For example, call if:    · You have sudden chest pain and shortness of breath, or you cough up blood.    Call your doctor now or seek immediate medical care if:    · You have increased or severe pain.     · You cannot move your toes or ankle.     · Your foot is cool or pale or changes color.     · You have tingling, weakness, or numbness in your foot or leg.     · Your splint or brace feels too tight.     · You are unable to straighten the knee, or the knee \"locks. \"     · You have signs of a blood clot in your leg, such as:  ¨ Pain in your calf, back of the knee, thigh, or groin. ¨ Redness and swelling in your leg.    Watch closely for changes in your health, and be sure to contact your doctor if:    · Your pain is not getting better or is getting worse. Where can you learn more? Go to http://alexandra-pantera.info/. Enter N406 in the search box to learn more about \"Knee Sprain: Care Instructions. \"  Current as of: November 29, 2017  Content Version: 11.8  © 8986-9797 [employer]. Care instructions adapted under license by [employer] (which disclaims liability or warranty for this information). If you have questions about a medical condition or this instruction, always ask your healthcare professional. [place] [address] any warranty or liability for your use of this information. Lateral Collateral Ligament Sprain: Rehab Exercises  Your Care Instructions  Here are some examples of typical rehabilitation exercises for your condition. Start each exercise slowly. Ease off the exercise if you start to have pain.   Your doctor or physical therapist will tell you when you can start these exercises and which ones will work best for you. How to do the exercises  Knee flexion with heel slide    1. Lie on your back with your knees bent. 2. Slide your heel back by bending your affected knee as far as you can. Then hook your other foot around your ankle to help pull your heel even farther back. 3. Hold for about 6 seconds, then rest for up to 10 seconds. 4. Repeat 8 to 12 times. Heel slides on a wall    1. Lie on the floor close enough to a wall so that you can place both legs up on the wall. Your hips should be as close to the wall as is comfortable for you. 2. Start with both feet resting on the wall. Slowly let the foot of your affected leg slide down the wall until you feel a stretch in your knee. 3. Hold for 15 to 30 seconds. 4. Then slowly slide your foot up to where you started. 5. Repeat 2 to 4 times. Quad sets    1. Sit with your affected leg straight and supported on the floor or a firm bed. Place a small, rolled-up towel under your knee. Your other leg should be bent, with that foot flat on the floor. 2. Tighten the thigh muscles of your affected leg by pressing the back of your knee down into the towel. 3. Hold for about 6 seconds, then rest for up to 10 seconds. 4. Repeat 8 to 12 times. Short-arc quad    1. Lie on your back with your knees bent over a foam roll or a large rolled-up towel. 2. Lift the lower part of your affected leg and straighten your knee by tightening your thigh muscle. Keep the bottom of your knee on the foam roll or rolled-up towel. 3. Hold your knee straight for about 6 seconds, then slowly bend your knee and lower your leg back to the floor. Rest for up to 10 seconds between repetitions. 4. Repeat 8 to 12 times. Straight-leg raises to the front    1. Lie on your back with your good knee bent so that your foot rests flat on the floor. Your affected leg should be straight. Make sure that your low back has a normal curve.  You should be able to slip your hand in between the floor and the small of your back, with your palm touching the floor and your back touching the back of your hand. 2. Tighten the thigh muscles in your affected leg by pressing the back of your knee flat down to the floor. Hold your knee straight. 3. Keeping the thigh muscles tight and your leg straight, lift your affected leg up so that your heel is about 12 inches off the floor. Hold for about 6 seconds, then lower slowly. 4. Relax for up to 10 seconds between repetitions. 5. Repeat 8 to 12 times. Hamstring set (heel dig)    1. Sit with your affected leg bent. Your good leg should be straight and supported on the floor. 2. Tighten the muscles on the back of your bent leg (hamstring) by pressing your heel into the floor. 3. Hold for about 6 seconds, then rest for up to 10 seconds. 4. Repeat 8 to 12 times. Hip adduction    1. Sit on the floor with your knees bent. 2. Place a pillow between your knees. 3. Put your hands slightly behind your hips for support. 4. Squeeze the pillow by tightening the muscles on the inside of your thighs. 5. Hold for 6 seconds, then rest for up to 10 seconds. 6. Repeat 8 to 12 times. Hip abduction    1. Sit on the floor with your affected knee close to a wall. 2. Bend your affected knee but keep the other leg straight in front of you. 3. Place a pillow between the outside of your knee and the wall. 4. Put your hands slightly behind your hips for support. 5. Push the outside of your knee against the pillow and the wall. 6. Hold for 6 seconds, then rest for up to 10 seconds. 7. Repeat 8 to 12 times. Lateral step-up    1. Stand sideways on the bottom step of a staircase with your injured leg on the step and your other foot on the floor. Hold on to the banister or wall. 2. Use your injured leg to raise yourself up, bringing your other foot level with the stair step. Make sure to keep your hips level as you do this.  And try to keep your knee moving in a straight line with your middle toe. Do not put the foot you are raising on the stair step. 3. Slowly lower your foot back down. 4. Repeat 8 to 12 times. Wall squats with ball    1. Stand with your back facing a wall. Place your feet about a shoulder-width apart. 2. Place the therapy ball between your back and the wall, and move your feet out in front of you so they are about a foot in front of your hips. 3. Keep your arms at your sides, or put your hands on your hips. 4. Slowly squat down as if you are going to sit in a chair, rolling your back over the ball as you squat. The ball should move with you but stay pressed into the wall. 5. Be sure that your knees do not go in front of your toes as you squat. 6. Hold for 6 seconds. 7. Slowly rise to your standing position. 8. Repeat 8 to 12 times. Follow-up care is a key part of your treatment and safety. Be sure to make and go to all appointments, and call your doctor if you are having problems. It's also a good idea to know your test results and keep a list of the medicines you take. Where can you learn more? Go to http://alexandra-pantera.info/. Enter A255 in the search box to learn more about \"Lateral Collateral Ligament Sprain: Rehab Exercises. \"  Current as of: November 29, 2017  Content Version: 11.8  © 2198-7037 [employer]. Care instructions adapted under license by [employer] (which disclaims liability or warranty for this information). If you have questions about a medical condition or this instruction, always ask your healthcare professional. [place] [address] any warranty or liability for your use of this information. Learning About RICE (Rest, Ice, Compression, and Elevation)  What is RICE? RICE is a way to care for an injury. RICE helps relieve pain and swelling. It may also help with healing and flexibility.  RICE stands for:  · Rest and protect the injured or sore area. · Ice or a cold pack used as soon as possible. · Compression, or wrapping the injured or sore area with an elastic bandage. · Elevation (propping up) the injured or sore area. How do you do RICE? You can use RICE for home treatment when you have general aches and pains or after an injury or surgery. Rest  · Do not put weight on the injury for at least 24 to 48 hours. · Use crutches for a badly sprained knee or ankle. · Support a sprained wrist, elbow, or shoulder with a sling. Ice  · Put ice or a cold pack on the injury right away to reduce pain and swelling. Frozen vegetables will also work as an ice pack. Put a thin cloth between the ice or cold pack and your skin. The cloth protects the injured area from getting too cold. · Use ice for 10 to 15 minutes at a time for the first 48 to 72 hours. Compression  · Use compression for sprains, strains, and surgeries of the arms and legs. · Wrap the injured area with an elastic bandage or compression sleeve to reduce swelling. · Don't wrap it too tightly. If the area below it feels numb, tingles, or feels cool, loosen the wrap. Elevation  · Use elevation for areas of the body that can be propped up, such as arms and legs. · Prop up the injured area on pillows whenever you use ice. Keep it propped up anytime you sit or lie down. · Try to keep the injured area at or above the level of your heart. This will help reduce swelling and bruising. Where can you learn more? Go to http://alexandra-pantera.info/. Enter W860 in the search box to learn more about \"Learning About RICE (Rest, Ice, Compression, and Elevation). \"  Current as of: November 29, 2017  Content Version: 11.8  © 7427-2559 [employer]. Care instructions adapted under license by [employer] (which disclaims liability or warranty for this information).  If you have questions about a medical condition or this instruction, always ask your healthcare professional. [address] any warranty or liability for your use of this information.

## 2018-10-16 NOTE — ED PROVIDER NOTES
EMERGENCY DEPARTMENT HISTORY AND PHYSICAL EXAM 
 
12:53 PM 
 
 
Date: 10/16/2018 Patient Name: Isidra Whitehead History of Presenting Illness Chief Complaint Patient presents with  Fall History Provided By: Patient and EMS Additional History (Context): Isidra Whitehead is a 37 y.o. female with past medical Hx of diabetes and hypertension who presents via EMS with acute bilateral leg pain of moderate severity. Patient says that her left knee hurts more than her right. Patient reports that knee pain is associated with a fall that happened today when she was going into dialysis. She says that she was getting off the bus and getting out of the wheel chair to use her walker and that she fell on her knees. No modifying or aggravating factors were reported. Denies any further complaints or symptoms at the moment. PCP: Kike Arguello., DO 
 
Current Outpatient Prescriptions Medication Sig Dispense Refill  diclofenac (VOLTAREN) 1 % gel Apply  to affected area four (4) times daily as needed. 100 g 0  
 atorvastatin (LIPITOR) 80 mg tablet Take 1 Tab by mouth daily. 30 Tab 0  clopidogrel (PLAVIX) 75 mg tab Take 1 Tab by mouth daily. 30 Tab 0  
 losartan (COZAAR) 50 mg tablet Take 1 Tab by mouth daily. 30 Tab 0  
 metoprolol tartrate (LOPRESSOR) 100 mg IR tablet Take 1 Tab by mouth two (2) times a day. 60 Tab 0  
 NIFEdipine ER (NIFEDICAL XL) 30 mg ER tablet Take 1 Tab by mouth daily. 30 Tab 0  
 SITagliptin (JANUVIA) 100 mg tablet Take 1 Tab by mouth daily. 30 Tab 0  
 raltegravir (ISENTRESS) 400 mg tablet Take 1 Tab by mouth two (2) times a day. 60 Tab 0  
 emtricitabine-tenofovir, TDF, (TRUVADA) 200-300 mg per tablet Take 1 Tab by mouth daily. 30 Tab 0  
 methylPREDNISolone (MEDROL DOSEPACK) 4 mg tablet Take as instructed 1 Dose Pack 0  
 lidocaine (XYLOCAINE) 2 % solution Take 5 mL by mouth three (3) times daily as needed for Pain (gargle and then spit out).  1 Bottle 0  
  oxyCODONE-acetaminophen (PERCOCET) 5-325 mg per tablet Take 1-2 Tabs by mouth every four (4) hours as needed. Max Daily Amount: 12 Tabs. 12 Tab 0  
 clindamycin (CLEOCIN) 300 mg capsule Take 1 Cap by mouth three (3) times daily. 24 Cap 0  
 LANTUS SOLOSTAR 100 unit/mL (3 mL) pen START 74 UNITS TWO TIMES A DAY . INCREASE BY 1 UNITS AM AND PM EVERY 3 DAYS UNTIL FBG IS CONSISTENTLY 150 5 Each 0  
 amitriptyline (ELAVIL) 100 mg tablet Take 100 mg by mouth nightly.  Insulin Needles, Disposable, (BD INSULIN PEN NEEDLE UF ORIG) 29 gauge X 1/2 \" ndle Diagnosis 250.00 use once daily 100 Each 12  
 glucose blood VI test strips (BLOOD GLUCOSE TEST) strip Test twice a day -- dispense any reliable brand to work with patients glucometer 1 Package 11  
 aspirin 81 mg chewable tablet Take 81 mg by mouth daily.  Blood-Glucose Meter monitoring kit Any reliable brand -- test twice a day -- dx 250.00 1 kit 0 Past History Past Medical History: 
Past Medical History:  
Diagnosis Date  Carpal tunnel syndrome 2013  Depressed 2013  Diabetes [facility]  HIV (human immunodeficiency virus infection) [facility]  HIV (human immunodeficiency virus infection) ([facility] [address] 2013  
 HTN (hypertension) 2013  Hypertension  Migraine 2015  Pure hypercholesterolemia 2013  Stroke [facility]) 2015  Type II or unspecified type diabetes mellitus without mention of complication, not stated as uncontrolled 2013 Past Surgical History: 
Past Surgical History:  
Procedure Laterality Date  CARDIAC SURG PROCEDURE UNLIST    
 2 vessel cabg  HX GYN  2005  
   HX HEENT Left Eye  
 HX ORTHOPAEDIC Right Hand Carpal Tunnel Family History: 
Family History Problem Relation Age of Onset  Diabetes Mother  Stroke Mother  Diabetes Father Social History: 
Social History Substance Use Topics  Smoking status: Former Smoker Packs/day: 1.50 Years: 2.00 Types: Cigarettes Quit date: 4/30/2000  Smokeless tobacco: Never Used  Alcohol use 0.5 oz/week 1 Standard drinks or equivalent per week Allergies: Allergies Allergen Reactions  Metformin Other (comments) Stopped because of heart disease by cardiologist 
  
 Vancomycin Itching and Swelling Review of Systems Review of Systems Constitutional:  
     Positive for fall Musculoskeletal: Positive for arthralgias and myalgias. All other systems reviewed and are negative. Physical Exam  
 
Visit Vitals  BP (!) 204/110  Pulse 94  Temp 97.8 °F (36.6 °C)  Resp 20  
 Ht 5' 5\" (1.651 m)  Wt 101 kg (222 lb 10.6 oz)  SpO2 95%  BMI 37.05 kg/m2 Physical Exam  
Constitutional: She is oriented to person, place, and time. She appears well-developed and well-nourished. No distress. HENT:  
Head: Normocephalic and atraumatic. Mouth/Throat: Mucous membranes are normal.  
Eyes: Pupils are equal, round, and reactive to light. Neck: Normal range of motion. Neck supple. Cardiovascular: Normal rate, regular rhythm, normal heart sounds and intact distal pulses. Exam reveals no gallop and no friction rub. No murmur heard. 2+ radial pulses bilaterally. No BLE edema. Pulmonary/Chest: Effort normal and breath sounds normal. No respiratory distress. She has no wheezes. She has no rales. Abdominal: Soft. Bowel sounds are normal. She exhibits no distension. There is no tenderness. Musculoskeletal: Normal range of motion. Positive for pain with flexion and extension of left leg Pain with varus but not valgus Negative lachman test 
No obv swelling to knee Lymphadenopathy:  
No lymphadenopathy. Neurological: She is alert and oriented to person, place, and time. No cranial nerve deficit. Skin: Skin is warm and dry. Nursing note and vitals reviewed. Diagnostic Study Results Labs - 
 No results found for this or any previous visit (from the past 12 hour(s)). Radiologic Studies -  
XR HIP LT W OR WO PELV  1 VW Final Result Impression: No fracture or dislocation left hip. Atherosclerosis. XR FEMUR LT 2 V    (Results Pending) XR SHOULDER LT AP/LAT MIN 2 V    (Results Pending) XR KNEE LT MIN 4 V    (Results Pending) Physician read all scans negative. (Pending official radiology read) Medical Decision Making I am the first provider for this patient. I reviewed the vital signs, available nursing notes, past medical history, past surgical history, family history and social history. Vital Signs-Reviewed the patient's vital signs. Records Reviewed: Nursing Notes Provider Notes (Medical Decision Making): MDM Number of Diagnoses or Management Options Fall from ground level:  
Knee effusion, left:  
Sprain of lateral collateral ligament of left knee, initial encounter:  
Diagnosis management comments: Diff dx: hip fx, dislocation, femur fx, knee sprain Pt had GLF getting off bus, received full HD, purely mechanical. She has pain with flex/ex and int/ext rotation of hip, will get xray pelvis, femur, knee to r/o fx or dislocation. Suspect sprain, will treat pain and likely safe for dc, f/u with ortho 
 
3:09 PM 
All xrays negative, pt is not very willing to try walking, however no need for nwb. Will encourage ambulation, f/u with ortho, PT if not improving. Gave exercises to help with pain. No concern for knee dislocation/vascular injury with good 2+ DPs Safe for d/c, careful return precautions given. Pt/family comfortable with plan Chris Taylor MD 
 
 
ED Course: Progress Notes, Reevaluation, and Consults: No concern for fracture based on story and exam. No concern for CTA. Diagnosis Clinical Impression: 1. Knee effusion, left 2. Sprain of lateral collateral ligament of left knee, initial encounter 3. Fall from ground level Disposition: Discharge Follow-up Information Follow up With Details Comments Contact Info Radha Sullivan., DO Go in 1 week [place] Follow up [id number] [facility] [address] [facility] [address] [id number] 
[phone] [facility] EMERGENCY DEPT Go to As needed, If symptoms worsen [address] 
[phone] Radha Sullivan., DO In 2 days  [address] [facility] [address] [facility] [address] [id number] 
[phone] [facility] EMERGENCY DEPT  As needed, If symptoms worsen [address] [facility] 
[phone] Mert Gardiner MD In 1 week If symptoms worsen [address] [place] [address] 
[phone] Patient's Medications Start Taking DICLOFENAC (VOLTAREN) 1 % GEL    Apply  to affected area four (4) times daily as needed. Continue Taking AMITRIPTYLINE (ELAVIL) 100 MG TABLET    Take 100 mg by mouth nightly. ASPIRIN 81 MG CHEWABLE TABLET    Take 81 mg by mouth daily. ATORVASTATIN (LIPITOR) 80 MG TABLET    Take 1 Tab by mouth daily. BLOOD-GLUCOSE METER MONITORING KIT    Any reliable brand -- test twice a day -- dx 250.00 CLINDAMYCIN (CLEOCIN) 300 MG CAPSULE    Take 1 Cap by mouth three (3) times daily. CLOPIDOGREL (PLAVIX) 75 MG TAB    Take 1 Tab by mouth daily. EMTRICITABINE-TENOFOVIR, TDF, (TRUVADA) 200-300 MG PER TABLET    Take 1 Tab by mouth daily. GLUCOSE BLOOD VI TEST STRIPS (BLOOD GLUCOSE TEST) STRIP    Test twice a day -- dispense any reliable brand to work with patients glucometer INSULIN NEEDLES, DISPOSABLE, (BD INSULIN PEN NEEDLE UF ORIG) 29 GAUGE X 1/2 \" NDLE    Diagnosis 250.00 use once daily LANTUS SOLOSTAR 100 UNIT/ML (3 ML) PEN    START 74 UNITS TWO TIMES A DAY . INCREASE BY 1 UNITS AM AND PM EVERY 3 DAYS UNTIL FBG IS CONSISTENTLY 150 LIDOCAINE (XYLOCAINE) 2 % SOLUTION    Take 5 mL by mouth three (3) times daily as needed for Pain (gargle and then spit out). LOSARTAN (COZAAR) 50 MG TABLET    Take 1 Tab by mouth daily. METHYLPREDNISOLONE (MEDROL DOSEPACK) 4 MG TABLET    Take as instructed METOPROLOL TARTRATE (LOPRESSOR) 100 MG IR TABLET    Take 1 Tab by mouth two (2) times a day. NIFEDIPINE ER (NIFEDICAL XL) 30 MG ER TABLET    Take 1 Tab by mouth daily. OXYCODONE-ACETAMINOPHEN (PERCOCET) 5-325 MG PER TABLET    Take 1-2 Tabs by mouth every four (4) hours as needed. Max Daily Amount: 12 Tabs. RALTEGRAVIR (ISENTRESS) 400 MG TABLET    Take 1 Tab by mouth two (2) times a day. SITAGLIPTIN (JANUVIA) 100 MG TABLET    Take 1 Tab by mouth daily. These Medications have changed No medications on file Stop Taking No medications on file  
 
_______________________________ Attestations: 
Scribe Attestation Alexandria Jimenez acting as a scribe for and in the presence of Danisha Betancourt MD     
October 16, 2018 at 12:53 PM 
    
Provider Attestation:     
I personally performed the services described in the documentation, reviewed the documentation, as recorded by the scribe in my presence, and it accurately and completely records my words and actions. October 16, 2018 at 12:53 PM - Danisha Betancourt MD   
_______________________________

## 2019-05-23 ENCOUNTER — HOSPITAL ENCOUNTER (EMERGENCY)
Age: 44
Discharge: HOME | End: 2019-05-23
Attending: EMERGENCY MEDICINE | Admitting: EMERGENCY MEDICINE
Payer: MEDICARE

## 2019-05-23 VITALS
DIASTOLIC BLOOD PRESSURE: 75 MMHG | WEIGHT: 230 LBS | OXYGEN SATURATION: 94 % | RESPIRATION RATE: 17 BRPM | BODY MASS INDEX: 38.32 KG/M2 | SYSTOLIC BLOOD PRESSURE: 182 MMHG | HEART RATE: 83 BPM | TEMPERATURE: 98.3 F | HEIGHT: 65 IN

## 2019-05-23 DIAGNOSIS — N93.9 ABNORMAL UTERINE BLEEDING: Primary | ICD-10-CM

## 2019-05-23 DIAGNOSIS — D64.9 CHRONIC ANEMIA: ICD-10-CM

## 2019-05-23 LAB
ANION GAP BLD CALC-SCNC: 19 MMOL/L (ref 10–20)
ANION GAP SERPL CALC-SCNC: 11 MMOL/L (ref 3–18)
ATRIAL RATE: 89 BPM
BASOPHILS # BLD: 0.1 K/UL (ref 0–0.1)
BASOPHILS NFR BLD: 0 % (ref 0–2)
BUN BLD-MCNC: 35 MG/DL (ref 7–18)
BUN SERPL-MCNC: 36 MG/DL (ref 7–18)
BUN/CREAT SERPL: 7 (ref 12–20)
CA-I BLD-MCNC: 0.87 MMOL/L (ref 1.12–1.32)
CALCIUM SERPL-MCNC: 6.7 MG/DL (ref 8.5–10.1)
CALCULATED P AXIS, ECG09: 58 DEGREES
CALCULATED R AXIS, ECG10: 6 DEGREES
CALCULATED T AXIS, ECG11: 128 DEGREES
CHLORIDE BLD-SCNC: 95 MMOL/L (ref 100–108)
CHLORIDE SERPL-SCNC: 98 MMOL/L (ref 100–108)
CO2 BLD-SCNC: 25 MMOL/L (ref 19–24)
CO2 SERPL-SCNC: 27 MMOL/L (ref 21–32)
CREAT SERPL-MCNC: 5.24 MG/DL (ref 0.6–1.3)
CREAT UR-MCNC: 5.4 MG/DL (ref 0.6–1.3)
DIAGNOSIS, 93000: NORMAL
DIFFERENTIAL METHOD BLD: ABNORMAL
EOSINOPHIL # BLD: 0.5 K/UL (ref 0–0.4)
EOSINOPHIL NFR BLD: 4 % (ref 0–5)
ERYTHROCYTE [DISTWIDTH] IN BLOOD BY AUTOMATED COUNT: 15.2 % (ref 11.6–14.5)
GLUCOSE BLD STRIP.AUTO-MCNC: 225 MG/DL (ref 74–106)
GLUCOSE SERPL-MCNC: 212 MG/DL (ref 74–99)
HCT VFR BLD AUTO: 27.1 % (ref 35–45)
HCT VFR BLD CALC: 27 % (ref 36–49)
HGB BLD-MCNC: 9 G/DL (ref 12–16)
HGB BLD-MCNC: 9.2 G/DL (ref 12–16)
LYMPHOCYTES # BLD: 1.8 K/UL (ref 0.9–3.6)
LYMPHOCYTES NFR BLD: 15 % (ref 21–52)
MCH RBC QN AUTO: 29.3 PG (ref 24–34)
MCHC RBC AUTO-ENTMCNC: 33.2 G/DL (ref 31–37)
MCV RBC AUTO: 88.3 FL (ref 74–97)
MONOCYTES # BLD: 0.8 K/UL (ref 0.05–1.2)
MONOCYTES NFR BLD: 7 % (ref 3–10)
NEUTS SEG # BLD: 8.6 K/UL (ref 1.8–8)
NEUTS SEG NFR BLD: 74 % (ref 40–73)
P-R INTERVAL, ECG05: 168 MS
PLATELET # BLD AUTO: 220 K/UL (ref 135–420)
PMV BLD AUTO: 10.9 FL (ref 9.2–11.8)
POTASSIUM BLD-SCNC: 3.3 MMOL/L (ref 3.5–5.5)
POTASSIUM SERPL-SCNC: 3.4 MMOL/L (ref 3.5–5.5)
Q-T INTERVAL, ECG07: 398 MS
QRS DURATION, ECG06: 86 MS
QTC CALCULATION (BEZET), ECG08: 484 MS
RBC # BLD AUTO: 3.07 M/UL (ref 4.2–5.3)
SERVICE CMNT-IMP: NORMAL
SODIUM BLD-SCNC: 134 MMOL/L (ref 136–145)
SODIUM SERPL-SCNC: 136 MMOL/L (ref 136–145)
VENTRICULAR RATE, ECG03: 89 BPM
WBC # BLD AUTO: 11.7 K/UL (ref 4.6–13.2)
WET PREP GENITAL: NORMAL

## 2019-05-23 PROCEDURE — 85025 COMPLETE CBC W/AUTO DIFF WBC: CPT

## 2019-05-23 PROCEDURE — 80048 BASIC METABOLIC PNL TOTAL CA: CPT

## 2019-05-23 PROCEDURE — 87210 SMEAR WET MOUNT SALINE/INK: CPT

## 2019-05-23 PROCEDURE — 99285 EMERGENCY DEPT VISIT HI MDM: CPT

## 2019-05-23 PROCEDURE — 93005 ELECTROCARDIOGRAM TRACING: CPT

## 2019-05-23 PROCEDURE — 80047 BASIC METABLC PNL IONIZED CA: CPT

## 2019-05-23 PROCEDURE — 87491 CHLMYD TRACH DNA AMP PROBE: CPT

## 2019-05-23 NOTE — ED TRIAGE NOTES
Pt arrived via EMS states that after dialysis today patient started having vaginal bleeding and abdominal pain

## 2019-05-23 NOTE — ED NOTES
Patient discharged and given discharge instructions. Patient had an opportunity to ask questions. Patient verbalized understanding of discharge instructions. Patient d/c from ED in a wheelchair, discharge instructions in hand. Patient accompanied by self. Removed and shredded pt's armband.

## 2019-05-23 NOTE — ED NOTES
Patient presents to ED with C/O left side chest pain, SOB, fever, chills, dizziness, vaginal bleeding, and N/V that started last night. The pt stated she was relaxing when it started. The pt denies diarrhea. Patient is A&Ox4, side rails upx2, call bell w/in reach, and aware of plan of care. The patient is in NAD.

## 2019-05-23 NOTE — ED PROVIDER NOTES
[facility]  EM[facility]ENCY DEPARTMENT HISTORY AND PHYSICAL EXAM       Date: 2019   Patient Name: Danny Bradford   YOB: 1975  Medical Record Number: [id number]    HISTORY OF PRESENTING ILLNESS:     Danny Bradford is a 40 y.o. female presenting with the noted PMH to the ED c/o heavy vaginal bleeding onset just prior to arrival after she completed her dialysis treatment today. Patient reports a history of heavy vaginal bleeding which is why she started the Depakote shot approximately 2 months ago. She typically has her periods at the beginning of the month patient reports associated dizziness    Primary Care Provider: Leann Guzman DO   Specialist:    Past Medical History:   Past Medical History:   Diagnosis Date    Carpal tunnel syndrome 2013    Depressed 2013    [facility]     HIV [facility] immunodeficiency virus infection) [facility]     HIV (human immunodeficiency virus infection) ([facility] 2013    HTN (hypertension) 2013    Hypertension     Migraine 2015    Pure hypercholesterolemia 2013    Stroke ([facility] 2015    Type II or unspecified type diabetes mellitus without mention of complication, not stated as uncontrolled 2013        Past Surgical History:   Past Surgical History:   Procedure Laterality Date    CARDIAC SURG PROCEDURE UNLIST      2 vessel cabg    HX GYN          HX HEENT      Left Eye    HX ORTHOPAEDIC      Right Hand Carpal Tunnel         Social History:   Social History     Tobacco Use    Smoking status: Former Smoker     Packs/day: 1.50     Years: 2.00     Pack years: 3.00     Types: Cigarettes     Last attempt to quit: 2000     Years since quittin.0    Smokeless tobacco: Never Used   Substance Use Topics    Alcohol use: Yes     Alcohol/week: 0.5 oz     Types: 1 Standard drinks or equivalent per week    Drug use: No        Allergies:    Allergies   Allergen Reactions    Metformin Other (comments)     Stopped because of heart disease by cardiologist      Vancomycin Itching and Swelling        REVIEW OF SYSTEMS:  Review of Systems   Constitutional: Negative for chills and fever. HENT: Negative for ear pain and sore throat. Eyes: Negative for pain and visual disturbance. Respiratory: Negative for cough and shortness of breath. Cardiovascular: Negative for chest pain and palpitations. Gastrointestinal: Negative for abdominal pain, diarrhea, nausea and vomiting. Genitourinary: Positive for vaginal bleeding. Negative for flank pain. Musculoskeletal: Negative for back pain and neck pain. Neurological: Positive for dizziness. Negative for syncope and headaches. Psychiatric/Behavioral: Negative for agitation. The patient is not nervous/anxious. PHYSICAL EXAM:  Vitals:    05/23/19 1123 05/23/19 1125 05/23/19 1130 05/23/19 1145   BP:  141/84 162/88 179/90   Pulse: 93  90 89   Resp: 18      Temp: 98.3 °F (36.8 °C)      SpO2: 98%  98% 99%   Weight: 104.3 kg (230 lb)      Height: 5' 5\" (1.651 m)          Physical Exam   Constitutional: She is oriented to person, place, and time. She appears well-developed and well-nourished. Morbid obesity   HENT:   Head: Normocephalic and atraumatic. Mouth/Throat: Oropharynx is clear and moist.   Eyes: Pupils are equal, round, and reactive to light. Conjunctivae and EOM are normal. No scleral icterus. Neck: Normal range of motion. Neck supple. No tracheal deviation present. Cardiovascular: Normal rate, regular rhythm and intact distal pulses. No murmur heard. Pulmonary/Chest: Effort normal and breath sounds normal. No respiratory distress. Abdominal: Soft. She exhibits no distension. There is no tenderness. There is no rebound and no guarding. Musculoskeletal: Normal range of motion. She exhibits no deformity. Neurological: She is alert and oriented to person, place, and time. No cranial nerve deficit.    No gross neuro deficit   Skin: Skin is warm and dry. No rash noted. She is not diaphoretic. Psychiatric: She has a normal mood and affect. Nursing note and vitals reviewed. Medications - No data to display    RESULTS:    Labs -   Labs Reviewed   CBC WITH AUTOMATED DIFF - Abnormal; Notable for the following components:       Result Value    RBC 3.07 (*)     HGB 9.0 (*)     HCT 27.1 (*)     RDW 15.2 (*)     NEUTROPHILS 74 (*)     LYMPHOCYTES 15 (*)     ABS. NEUTROPHILS 8.6 (*)     ABS. EOSINOPHILS 0.5 (*)     All other components within normal limits   METABOLIC PANEL, BASIC - Abnormal; Notable for the following components:    Potassium 3.4 (*)     Chloride 98 (*)     Glucose 212 (*)     BUN 36 (*)     Creatinine 5.24 (*)     BUN/Creatinine ratio 7 (*)     GFR est AA 11 (*)     GFR est non-AA 9 (*)     Calcium 6.7 (*)     All other components within normal limits   POC CHEM8 - Abnormal; Notable for the following components:    CO2, POC 25 (*)     Glucose,  (*)     BUN, POC 35 (*)     Creatinine, POC 5.4 (*)     GFRAA, POC 10 (*)     GFRNA, POC 9 (*)     Sodium,  (*)     Potassium, POC 3.3 (*)     Calcium, ionized (POC) 0.87 (*)     Chloride, POC 95 (*)     Hematocrit, POC 27 (*)     Hemoglobin, POC 9.2 (*)     All other components within normal limits   WET PREP   CHLAMYDIA/NEISSERIA AMPLIFICATION   POC CHEM8   TYPE & SCREEN       Radiologic Studies -  No results found. MEDICAL DECISION MAKING    DDX: Abnormal uterine bleeding, depo adverse effect, fibroids, anemia    40 y.o. female with noted past medical history who presented with heavy vaginal bleeding in the setting of depo use. Vitals wnl. Benign abd exam.     ED Course as of May 23 1440   Thu May 23, 2019   1304 Hemoglobin 9.0 today on chart review patient is hemoglobin baseline ranges from the nines to tens. [KG]   1332 Pelvic exam was performed which showed a mild to moderate amount of maroon blood in the vaginal vault.   Patient had no adnexal tenderness but mild tenderness with cervical motion, I expressed my concern for PID and the patient said that there was a history of protected sex and that there was no way she could have chlamydia or gonorrhea so she declined treatment at this time and preferred waiting for her results. She was unaware that the double shot caused irregular bleeding. Return precautions were discussed and patient will follow up with her OB/GYN. [KG]      ED Course User Index  [KG] Jared Mcgraw R, DO     Normal wet prep. Other labs consistent with history of end-stage renal disease. Patient has no new complaints, changes, or physical findings. Results were reviewed with the patient. Pt's questions and concerns were addressed. Care plan was outlined, including follow-up with PCP/specialist and return precautions were discussed. Patient is felt to be stable for discharge at this time. Diagnosis   Clinical Impression:   1. Abnormal uterine bleeding    2.  Chronic anemia           Follow-up Information     Follow up With Specialties Details Why [facility] EMERGENCY DEPT Emergency Medicine  If symptoms worsen [address] [facility] [address]  [phone]       Please make enough please make an appointment to follow-up with your OB/GYN physician regarding your abnormal vaginal bleeding           Current Discharge Medication List          _______________________________   Attestations:

## 2019-05-23 NOTE — DISCHARGE INSTRUCTIONS
Patient Education        Abnormal Uterine Bleeding: Care Instructions  Your Care Instructions    Abnormal uterine bleeding (AUB) is irregular bleeding from the uterus that is longer or heavier than usual or does not occur at your regular time. Sometimes it is caused by changes in hormone levels. It can also be caused by growths in the uterus, such as fibroids or polyps. Sometimes a cause cannot be found. You may have heavy bleeding when you are not expecting your period. Your doctor may suggest a pregnancy test, if you think you are pregnant. Follow-up care is a key part of your treatment and safety. Be sure to make and go to all appointments, and call your doctor if you are having problems. It's also a good idea to know your test results and keep a list of the medicines you take. How can you care for yourself at home? · Be safe with medicines. Take pain medicines exactly as directed. ? If the doctor gave you a prescription medicine for pain, take it as prescribed. ? If you are not taking a prescription pain medicine, ask your doctor if you can take an over-the-counter medicine. · You may be low in iron because of blood loss. Eat a balanced diet that is high in iron and vitamin C. Foods rich in iron include red meat, shellfish, eggs, beans, and leafy green vegetables. Talk to your doctor about whether you need to take iron pills or a multivitamin. When should you call for help? Call 911 anytime you think you may need emergency care. For example, call if:    · You passed out (lost consciousness).    Call your doctor now or seek immediate medical care if:    · You have new or worse belly or pelvic pain.     · You have severe vaginal bleeding.     · You feel dizzy or lightheaded, or you feel like you may faint.    Watch closely for changes in your health, and be sure to contact your doctor if:    · You think you may be pregnant.     · Your bleeding gets worse.     · You do not get better as expected.    Where can you learn more? Go to http://alexandra-pantera.info/. Enter B082 in the search box to learn more about \"Abnormal Uterine Bleeding: Care Instructions. \"  Current as of: May 14, 2018  Content Version: 11.9  © 8762-9864 [employer]. Care instructions adapted under license by [employer] (which disclaims liability or warranty for this information). If you have questions about a medical condition or this instruction, always ask your healthcare professional. [place] [address] any warranty or liability for your use of this information.

## 2019-05-24 LAB
C TRACH RRNA SPEC QL NAA+PROBE: NEGATIVE
N GONORRHOEA RRNA SPEC QL NAA+PROBE: NEGATIVE
SPECIMEN SOURCE: NORMAL

## 2020-02-20 LAB — HBA1C MFR BLD HPLC: 8 %
